# Patient Record
Sex: FEMALE | Race: OTHER | HISPANIC OR LATINO
[De-identification: names, ages, dates, MRNs, and addresses within clinical notes are randomized per-mention and may not be internally consistent; named-entity substitution may affect disease eponyms.]

---

## 2017-11-02 PROBLEM — Z00.00 ENCOUNTER FOR PREVENTIVE HEALTH EXAMINATION: Status: ACTIVE | Noted: 2017-11-02

## 2017-11-10 ENCOUNTER — ASOB RESULT (OUTPATIENT)
Age: 37
End: 2017-11-10

## 2017-11-10 ENCOUNTER — APPOINTMENT (OUTPATIENT)
Dept: MATERNAL FETAL MEDICINE | Facility: CLINIC | Age: 37
End: 2017-11-10
Payer: MEDICAID

## 2017-11-10 PROCEDURE — 99211 OFF/OP EST MAY X REQ PHY/QHP: CPT

## 2021-11-02 ENCOUNTER — APPOINTMENT (OUTPATIENT)
Dept: CT IMAGING | Facility: CLINIC | Age: 41
End: 2021-11-02

## 2022-04-11 ENCOUNTER — RESULT REVIEW (OUTPATIENT)
Age: 42
End: 2022-04-11

## 2022-04-11 ENCOUNTER — OUTPATIENT (OUTPATIENT)
Dept: OUTPATIENT SERVICES | Facility: HOSPITAL | Age: 42
LOS: 1 days | End: 2022-04-11
Payer: COMMERCIAL

## 2022-04-11 ENCOUNTER — APPOINTMENT (OUTPATIENT)
Dept: CT IMAGING | Facility: CLINIC | Age: 42
End: 2022-04-11
Payer: COMMERCIAL

## 2022-04-11 DIAGNOSIS — Z00.00 ENCOUNTER FOR GENERAL ADULT MEDICAL EXAMINATION WITHOUT ABNORMAL FINDINGS: ICD-10-CM

## 2022-04-11 PROCEDURE — 73706 CT ANGIO LWR EXTR W/O&W/DYE: CPT

## 2022-04-11 PROCEDURE — 73706 CT ANGIO LWR EXTR W/O&W/DYE: CPT | Mod: 26,50

## 2022-05-16 ENCOUNTER — APPOINTMENT (OUTPATIENT)
Dept: OTOLARYNGOLOGY | Facility: CLINIC | Age: 42
End: 2022-05-16

## 2022-06-06 ENCOUNTER — OUTPATIENT (OUTPATIENT)
Dept: OUTPATIENT SERVICES | Facility: HOSPITAL | Age: 42
LOS: 1 days | End: 2022-06-06

## 2022-06-06 ENCOUNTER — NON-APPOINTMENT (OUTPATIENT)
Age: 42
End: 2022-06-06

## 2022-06-06 VITALS
TEMPERATURE: 97 F | WEIGHT: 167.99 LBS | DIASTOLIC BLOOD PRESSURE: 64 MMHG | HEART RATE: 65 BPM | SYSTOLIC BLOOD PRESSURE: 100 MMHG | RESPIRATION RATE: 14 BRPM | OXYGEN SATURATION: 99 % | HEIGHT: 59.5 IN

## 2022-06-06 DIAGNOSIS — D16.5 BENIGN NEOPLASM OF LOWER JAW BONE: ICD-10-CM

## 2022-06-06 LAB
ANION GAP SERPL CALC-SCNC: 12 MMOL/L — SIGNIFICANT CHANGE UP (ref 7–14)
BLD GP AB SCN SERPL QL: NEGATIVE — SIGNIFICANT CHANGE UP
BUN SERPL-MCNC: 5 MG/DL — LOW (ref 7–23)
CALCIUM SERPL-MCNC: 8.9 MG/DL — SIGNIFICANT CHANGE UP (ref 8.4–10.5)
CHLORIDE SERPL-SCNC: 102 MMOL/L — SIGNIFICANT CHANGE UP (ref 98–107)
CO2 SERPL-SCNC: 25 MMOL/L — SIGNIFICANT CHANGE UP (ref 22–31)
CREAT SERPL-MCNC: 0.65 MG/DL — SIGNIFICANT CHANGE UP (ref 0.5–1.3)
EGFR: 111 ML/MIN/1.73M2 — SIGNIFICANT CHANGE UP
GLUCOSE SERPL-MCNC: 103 MG/DL — HIGH (ref 70–99)
HCG UR QL: NEGATIVE — SIGNIFICANT CHANGE UP
HCT VFR BLD CALC: 40.5 % — SIGNIFICANT CHANGE UP (ref 34.5–45)
HGB BLD-MCNC: 12.9 G/DL — SIGNIFICANT CHANGE UP (ref 11.5–15.5)
MCHC RBC-ENTMCNC: 29.9 PG — SIGNIFICANT CHANGE UP (ref 27–34)
MCHC RBC-ENTMCNC: 31.9 GM/DL — LOW (ref 32–36)
MCV RBC AUTO: 94 FL — SIGNIFICANT CHANGE UP (ref 80–100)
NRBC # BLD: 0 /100 WBCS — SIGNIFICANT CHANGE UP
NRBC # FLD: 0 K/UL — SIGNIFICANT CHANGE UP
PLATELET # BLD AUTO: 238 K/UL — SIGNIFICANT CHANGE UP (ref 150–400)
POTASSIUM SERPL-MCNC: 4.1 MMOL/L — SIGNIFICANT CHANGE UP (ref 3.5–5.3)
POTASSIUM SERPL-SCNC: 4.1 MMOL/L — SIGNIFICANT CHANGE UP (ref 3.5–5.3)
RBC # BLD: 4.31 M/UL — SIGNIFICANT CHANGE UP (ref 3.8–5.2)
RBC # FLD: 12.3 % — SIGNIFICANT CHANGE UP (ref 10.3–14.5)
RH IG SCN BLD-IMP: POSITIVE — SIGNIFICANT CHANGE UP
SODIUM SERPL-SCNC: 139 MMOL/L — SIGNIFICANT CHANGE UP (ref 135–145)
WBC # BLD: 6.91 K/UL — SIGNIFICANT CHANGE UP (ref 3.8–10.5)
WBC # FLD AUTO: 6.91 K/UL — SIGNIFICANT CHANGE UP (ref 3.8–10.5)

## 2022-06-06 NOTE — H&P PST ADULT - ENMT COMMENTS
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

## 2022-06-06 NOTE — H&P PST ADULT - HISTORY OF PRESENT ILLNESS
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

## 2022-06-06 NOTE — H&P PST ADULT - PROBLEM SELECTOR PLAN 1
Assessment and Plan: Patient scheduled for surgery on 6/21/22  Patient provided with verbal and written presurgical instructions; verbalized understanding  with teach back.    Patient provided with famotidine for GI prophylaxis; verbalized understanding.    Patient instructed to call surgeon to schedule COVID appointment     Problem: Pre-menopausal   Urine specimen cup provided

## 2022-06-06 NOTE — H&P PST ADULT - NEGATIVE ENMT SYMPTOMS
no hearing difficulty/no ear pain/no tinnitus/no vertigo/no gum bleeding/no throat pain/no dysphagia

## 2022-06-07 ENCOUNTER — NON-APPOINTMENT (OUTPATIENT)
Age: 42
End: 2022-06-07

## 2022-06-08 ENCOUNTER — NON-APPOINTMENT (OUTPATIENT)
Age: 42
End: 2022-06-08

## 2022-06-09 PROBLEM — D16.5 BENIGN NEOPLASM OF LOWER JAW BONE: Chronic | Status: ACTIVE | Noted: 2022-06-06

## 2022-06-16 ENCOUNTER — APPOINTMENT (OUTPATIENT)
Dept: OTOLARYNGOLOGY | Facility: CLINIC | Age: 42
End: 2022-06-16

## 2022-06-16 VITALS
BODY MASS INDEX: 32.39 KG/M2 | DIASTOLIC BLOOD PRESSURE: 83 MMHG | SYSTOLIC BLOOD PRESSURE: 125 MMHG | HEART RATE: 68 BPM | WEIGHT: 165 LBS | HEIGHT: 60 IN

## 2022-06-16 DIAGNOSIS — Z78.9 OTHER SPECIFIED HEALTH STATUS: ICD-10-CM

## 2022-06-16 PROCEDURE — 99024 POSTOP FOLLOW-UP VISIT: CPT

## 2022-06-20 ENCOUNTER — TRANSCRIPTION ENCOUNTER (OUTPATIENT)
Age: 42
End: 2022-06-20

## 2022-06-21 ENCOUNTER — INPATIENT (INPATIENT)
Facility: HOSPITAL | Age: 42
LOS: 6 days | Discharge: HOME | End: 2022-06-28
Attending: ORAL & MAXILLOFACIAL SURGERY | Admitting: ORAL & MAXILLOFACIAL SURGERY
Payer: SELF-PAY

## 2022-06-21 ENCOUNTER — APPOINTMENT (OUTPATIENT)
Dept: OTOLARYNGOLOGY | Facility: HOSPITAL | Age: 42
End: 2022-06-21

## 2022-06-21 ENCOUNTER — RESULT REVIEW (OUTPATIENT)
Age: 42
End: 2022-06-21

## 2022-06-21 VITALS
HEIGHT: 59.5 IN | WEIGHT: 167.99 LBS | HEART RATE: 70 BPM | OXYGEN SATURATION: 100 % | TEMPERATURE: 99 F | RESPIRATION RATE: 18 BRPM | SYSTOLIC BLOOD PRESSURE: 124 MMHG | DIASTOLIC BLOOD PRESSURE: 82 MMHG

## 2022-06-21 DIAGNOSIS — D16.5 BENIGN NEOPLASM OF LOWER JAW BONE: ICD-10-CM

## 2022-06-21 LAB
ANION GAP SERPL CALC-SCNC: 17 MMOL/L — HIGH (ref 7–14)
APTT BLD: 28.1 SEC — SIGNIFICANT CHANGE UP (ref 27–36.3)
BLOOD GAS ARTERIAL - LYTES,HGB,ICA,LACT RESULT: SIGNIFICANT CHANGE UP
BLOOD GAS ARTERIAL COMPREHENSIVE RESULT: SIGNIFICANT CHANGE UP
BUN SERPL-MCNC: 5 MG/DL — LOW (ref 7–23)
CALCIUM SERPL-MCNC: 7.9 MG/DL — LOW (ref 8.4–10.5)
CHLORIDE SERPL-SCNC: 105 MMOL/L — SIGNIFICANT CHANGE UP (ref 98–107)
CO2 SERPL-SCNC: 18 MMOL/L — LOW (ref 22–31)
CREAT SERPL-MCNC: 0.51 MG/DL — SIGNIFICANT CHANGE UP (ref 0.5–1.3)
EGFR: 118 ML/MIN/1.73M2 — SIGNIFICANT CHANGE UP
GAS PNL BLDA: SIGNIFICANT CHANGE UP
GLUCOSE BLDC GLUCOMTR-MCNC: 166 MG/DL — HIGH (ref 70–99)
GLUCOSE SERPL-MCNC: 199 MG/DL — HIGH (ref 70–99)
HCG UR QL: NEGATIVE — SIGNIFICANT CHANGE UP
HCT VFR BLD CALC: 33.3 % — LOW (ref 34.5–45)
HGB BLD-MCNC: 11.1 G/DL — LOW (ref 11.5–15.5)
INR BLD: 1.2 RATIO — HIGH (ref 0.88–1.16)
MAGNESIUM SERPL-MCNC: 1.5 MG/DL — LOW (ref 1.6–2.6)
MCHC RBC-ENTMCNC: 30 PG — SIGNIFICANT CHANGE UP (ref 27–34)
MCHC RBC-ENTMCNC: 33.3 GM/DL — SIGNIFICANT CHANGE UP (ref 32–36)
MCV RBC AUTO: 90 FL — SIGNIFICANT CHANGE UP (ref 80–100)
NRBC # BLD: 0 /100 WBCS — SIGNIFICANT CHANGE UP
NRBC # FLD: 0 K/UL — SIGNIFICANT CHANGE UP
PHOSPHATE SERPL-MCNC: 3.6 MG/DL — SIGNIFICANT CHANGE UP (ref 2.5–4.5)
PLATELET # BLD AUTO: 217 K/UL — SIGNIFICANT CHANGE UP (ref 150–400)
POTASSIUM SERPL-MCNC: 4.1 MMOL/L — SIGNIFICANT CHANGE UP (ref 3.5–5.3)
POTASSIUM SERPL-SCNC: 4.1 MMOL/L — SIGNIFICANT CHANGE UP (ref 3.5–5.3)
PROTHROM AB SERPL-ACNC: 13.9 SEC — HIGH (ref 10.5–13.4)
RBC # BLD: 3.7 M/UL — LOW (ref 3.8–5.2)
RBC # FLD: 12.5 % — SIGNIFICANT CHANGE UP (ref 10.3–14.5)
SODIUM SERPL-SCNC: 140 MMOL/L — SIGNIFICANT CHANGE UP (ref 135–145)
TSH SERPL-MCNC: 0.39 UIU/ML — SIGNIFICANT CHANGE UP (ref 0.27–4.2)
WBC # BLD: 14.85 K/UL — HIGH (ref 3.8–10.5)
WBC # FLD AUTO: 14.85 K/UL — HIGH (ref 3.8–10.5)

## 2022-06-21 PROCEDURE — 88307 TISSUE EXAM BY PATHOLOGIST: CPT | Mod: 26

## 2022-06-21 PROCEDURE — 27705 OSTEOTOMY TIBIA: CPT

## 2022-06-21 PROCEDURE — 71045 X-RAY EXAM CHEST 1 VIEW: CPT | Mod: 26

## 2022-06-21 PROCEDURE — 40844 RECONSTRUCTION OF MOUTH: CPT

## 2022-06-21 PROCEDURE — 21244 RECONSTRUCTION OF LOWER JAW: CPT

## 2022-06-21 PROCEDURE — 70360 X-RAY EXAM OF NECK: CPT | Mod: 26

## 2022-06-21 PROCEDURE — 73590 X-RAY EXAM OF LOWER LEG: CPT | Mod: 26,LT

## 2022-06-21 PROCEDURE — 20969 BONE/SKIN GRAFT MICROVASC: CPT

## 2022-06-21 DEVICE — GUIDE CUTTING RECON IPS X-LRG: Type: IMPLANTABLE DEVICE | Status: FUNCTIONAL

## 2022-06-21 DEVICE — SCREW CR DRV 2.0X11MM: Type: IMPLANTABLE DEVICE | Status: FUNCTIONAL

## 2022-06-21 DEVICE — COUPLER VESSEL ANASTOMOTIC 3MM: Type: IMPLANTABLE DEVICE | Status: FUNCTIONAL

## 2022-06-21 DEVICE — IMPLANTABLE DEVICE: Type: IMPLANTABLE DEVICE | Status: FUNCTIONAL

## 2022-06-21 DEVICE — SCREW MAXDRIVE 1 LVL 2X7MM: Type: IMPLANTABLE DEVICE | Status: FUNCTIONAL

## 2022-06-21 DEVICE — SCREW MAXDRIVE 2.0X7MM: Type: IMPLANTABLE DEVICE | Status: FUNCTIONAL

## 2022-06-21 DEVICE — SURGIFOAM PAD 8CM X 12.5CM X 2MM (100C): Type: IMPLANTABLE DEVICE | Status: FUNCTIONAL

## 2022-06-21 DEVICE — SCREW LOCKING 2X13MM: Type: IMPLANTABLE DEVICE | Status: FUNCTIONAL

## 2022-06-21 DEVICE — GUIDE RECONSTRUCT IPS CUTTING: Type: IMPLANTABLE DEVICE | Status: FUNCTIONAL

## 2022-06-21 DEVICE — SURGICEL 2 X 14": Type: IMPLANTABLE DEVICE | Status: FUNCTIONAL

## 2022-06-21 DEVICE — LIGATING CLIPS WECK HORIZON MEDIUM (BLUE) 24: Type: IMPLANTABLE DEVICE | Status: FUNCTIONAL

## 2022-06-21 DEVICE — LIGATING CLIPS WECK HORIZON SMALL-WIDE (RED) 24: Type: IMPLANTABLE DEVICE | Status: FUNCTIONAL

## 2022-06-21 DEVICE — SCREW LOKG 2X7MM: Type: IMPLANTABLE DEVICE | Status: FUNCTIONAL

## 2022-06-21 DEVICE — CARTRIDGE MICROCLIP 30: Type: IMPLANTABLE DEVICE | Status: FUNCTIONAL

## 2022-06-21 DEVICE — SCREW EMERG CROSS DRIVE TI 2X9: Type: IMPLANTABLE DEVICE | Status: FUNCTIONAL

## 2022-06-21 DEVICE — SCREW LOKG 2X9MM: Type: IMPLANTABLE DEVICE | Status: FUNCTIONAL

## 2022-06-21 DEVICE — GUIDE CUTTING RECON IPS X-SMALL: Type: IMPLANTABLE DEVICE | Status: FUNCTIONAL

## 2022-06-21 DEVICE — SCREW MAXDRIVE CMF LVL 1 2X8MM: Type: IMPLANTABLE DEVICE | Status: FUNCTIONAL

## 2022-06-21 DEVICE — SCREW LOKG 2X11MM: Type: IMPLANTABLE DEVICE | Status: FUNCTIONAL

## 2022-06-21 DEVICE — DOPPLER PROBE DISPOSABLE: Type: IMPLANTABLE DEVICE | Status: FUNCTIONAL

## 2022-06-21 RX ORDER — AMPICILLIN SODIUM AND SULBACTAM SODIUM 250; 125 MG/ML; MG/ML
1.5 INJECTION, POWDER, FOR SUSPENSION INTRAMUSCULAR; INTRAVENOUS EVERY 6 HOURS
Refills: 0 | Status: COMPLETED | OUTPATIENT
Start: 2022-06-21 | End: 2022-06-22

## 2022-06-21 RX ORDER — ONDANSETRON 8 MG/1
4 TABLET, FILM COATED ORAL EVERY 8 HOURS
Refills: 0 | Status: DISCONTINUED | OUTPATIENT
Start: 2022-06-21 | End: 2022-06-22

## 2022-06-21 RX ORDER — GLUCAGON INJECTION, SOLUTION 0.5 MG/.1ML
1 INJECTION, SOLUTION SUBCUTANEOUS ONCE
Refills: 0 | Status: DISCONTINUED | OUTPATIENT
Start: 2022-06-21 | End: 2022-06-22

## 2022-06-21 RX ORDER — PANTOPRAZOLE SODIUM 20 MG/1
40 TABLET, DELAYED RELEASE ORAL DAILY
Refills: 0 | Status: DISCONTINUED | OUTPATIENT
Start: 2022-06-21 | End: 2022-06-22

## 2022-06-21 RX ORDER — SODIUM CHLORIDE 9 MG/ML
1000 INJECTION, SOLUTION INTRAVENOUS
Refills: 0 | Status: DISCONTINUED | OUTPATIENT
Start: 2022-06-21 | End: 2022-06-22

## 2022-06-21 RX ORDER — DEXTROSE 50 % IN WATER 50 %
25 SYRINGE (ML) INTRAVENOUS ONCE
Refills: 0 | Status: DISCONTINUED | OUTPATIENT
Start: 2022-06-21 | End: 2022-06-23

## 2022-06-21 RX ORDER — DEXAMETHASONE 0.5 MG/5ML
8 ELIXIR ORAL EVERY 8 HOURS
Refills: 0 | Status: COMPLETED | OUTPATIENT
Start: 2022-06-21 | End: 2022-06-22

## 2022-06-21 RX ORDER — GABAPENTIN 400 MG/1
600 CAPSULE ORAL DAILY
Refills: 0 | Status: DISCONTINUED | OUTPATIENT
Start: 2022-06-21 | End: 2022-06-21

## 2022-06-21 RX ORDER — ACETAMINOPHEN 500 MG
975 TABLET ORAL EVERY 6 HOURS
Refills: 0 | Status: DISCONTINUED | OUTPATIENT
Start: 2022-06-21 | End: 2022-06-22

## 2022-06-21 RX ORDER — HYDROMORPHONE HYDROCHLORIDE 2 MG/ML
1 INJECTION INTRAMUSCULAR; INTRAVENOUS; SUBCUTANEOUS EVERY 4 HOURS
Refills: 0 | Status: DISCONTINUED | OUTPATIENT
Start: 2022-06-21 | End: 2022-06-21

## 2022-06-21 RX ORDER — HYDROMORPHONE HYDROCHLORIDE 2 MG/ML
0.5 INJECTION INTRAMUSCULAR; INTRAVENOUS; SUBCUTANEOUS EVERY 4 HOURS
Refills: 0 | Status: DISCONTINUED | OUTPATIENT
Start: 2022-06-21 | End: 2022-06-22

## 2022-06-21 RX ORDER — INSULIN LISPRO 100/ML
VIAL (ML) SUBCUTANEOUS EVERY 6 HOURS
Refills: 0 | Status: DISCONTINUED | OUTPATIENT
Start: 2022-06-21 | End: 2022-06-21

## 2022-06-21 RX ORDER — SENNA PLUS 8.6 MG/1
2 TABLET ORAL AT BEDTIME
Refills: 0 | Status: DISCONTINUED | OUTPATIENT
Start: 2022-06-21 | End: 2022-06-22

## 2022-06-21 RX ORDER — CHLORHEXIDINE GLUCONATE 213 G/1000ML
1 SOLUTION TOPICAL DAILY
Refills: 0 | Status: DISCONTINUED | OUTPATIENT
Start: 2022-06-21 | End: 2022-06-21

## 2022-06-21 RX ORDER — HYDROMORPHONE HYDROCHLORIDE 2 MG/ML
0.5 INJECTION INTRAMUSCULAR; INTRAVENOUS; SUBCUTANEOUS EVERY 4 HOURS
Refills: 0 | Status: DISCONTINUED | OUTPATIENT
Start: 2022-06-21 | End: 2022-06-21

## 2022-06-21 RX ORDER — MAGNESIUM SULFATE 500 MG/ML
2 VIAL (ML) INJECTION ONCE
Refills: 0 | Status: DISCONTINUED | OUTPATIENT
Start: 2022-06-21 | End: 2022-06-21

## 2022-06-21 RX ORDER — HYDROMORPHONE HYDROCHLORIDE 2 MG/ML
0.25 INJECTION INTRAMUSCULAR; INTRAVENOUS; SUBCUTANEOUS EVERY 4 HOURS
Refills: 0 | Status: DISCONTINUED | OUTPATIENT
Start: 2022-06-21 | End: 2022-06-22

## 2022-06-21 RX ORDER — CALCIUM GLUCONATE 100 MG/ML
1 VIAL (ML) INTRAVENOUS ONCE
Refills: 0 | Status: COMPLETED | OUTPATIENT
Start: 2022-06-21 | End: 2022-06-21

## 2022-06-21 RX ORDER — INSULIN LISPRO 100/ML
VIAL (ML) SUBCUTANEOUS
Refills: 0 | Status: DISCONTINUED | OUTPATIENT
Start: 2022-06-21 | End: 2022-06-22

## 2022-06-21 RX ORDER — ENOXAPARIN SODIUM 100 MG/ML
40 INJECTION SUBCUTANEOUS EVERY 24 HOURS
Refills: 0 | Status: DISCONTINUED | OUTPATIENT
Start: 2022-06-22 | End: 2022-06-28

## 2022-06-21 RX ORDER — MAGNESIUM SULFATE 500 MG/ML
2 VIAL (ML) INJECTION
Refills: 0 | Status: COMPLETED | OUTPATIENT
Start: 2022-06-21 | End: 2022-06-21

## 2022-06-21 RX ORDER — DEXTROSE 50 % IN WATER 50 %
12.5 SYRINGE (ML) INTRAVENOUS ONCE
Refills: 0 | Status: DISCONTINUED | OUTPATIENT
Start: 2022-06-21 | End: 2022-06-23

## 2022-06-21 RX ORDER — GABAPENTIN 400 MG/1
600 CAPSULE ORAL DAILY
Refills: 0 | Status: DISCONTINUED | OUTPATIENT
Start: 2022-06-21 | End: 2022-06-22

## 2022-06-21 RX ADMIN — HYDROMORPHONE HYDROCHLORIDE 1 MILLIGRAM(S): 2 INJECTION INTRAMUSCULAR; INTRAVENOUS; SUBCUTANEOUS at 20:44

## 2022-06-21 RX ADMIN — Medication 101.6 MILLIGRAM(S): at 20:40

## 2022-06-21 RX ADMIN — Medication 100 GRAM(S): at 22:41

## 2022-06-21 RX ADMIN — Medication 25 GRAM(S): at 23:23

## 2022-06-21 RX ADMIN — HYDROMORPHONE HYDROCHLORIDE 1 MILLIGRAM(S): 2 INJECTION INTRAMUSCULAR; INTRAVENOUS; SUBCUTANEOUS at 20:59

## 2022-06-21 RX ADMIN — Medication 25 GRAM(S): at 21:18

## 2022-06-21 RX ADMIN — AMPICILLIN SODIUM AND SULBACTAM SODIUM 100 GRAM(S): 250; 125 INJECTION, POWDER, FOR SUSPENSION INTRAMUSCULAR; INTRAVENOUS at 21:17

## 2022-06-21 NOTE — CONSULT NOTE ADULT - SUBJECTIVE AND OBJECTIVE BOX
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 Patient is POD#0 s/p mandibulectomy, reconstruction of the mandible with transosteal bone implant, Right neck dissection and a left lower leg free flap.       Surgery Information    Case Duration:      EBL: 300ml       IV Fluids: 3000ml LR, 500ml Albumin 5%       Blood Products:       Urine Output: 450ml   Complications:        HISTORY  42y Female  Allergies:   PAST MEDICAL & SURGICAL HISTORY:  Benign neoplasm of lower jaw bone        FAMILY HISTORY:  Family history of diabetes mellitus (DM) (Mother)        SOCIAL HISTORY:  ***    ADVANCE DIRECTIVES: Presumed Full Code  ***    REVIEW OF SYSTEMS:  ***  General: Non-Contributory  Skin/Breast: Non-Contributory  Ophthalmologic: Non-Contributory  ENMT: Non-Contributory  Respiratory and Thorax: Non-Contributory  Cardiovascular: Non-Contributory  Gastrointestinal: Non-Contributory  Genitourinary: Non-Contributory  Musculoskeletal: Non-Contributory  Neurological: Non-Contributory  Psychiatric: Non-Contributory  Hematology/Lymphatics: Non-Contributory  Endocrine: Non-Contributory  Allergic/Immunologic: Non-Contributory    HOME MEDICATIONS:  ***    CURRENT MEDICATIONS:   --------------------------------------------------------------------------------------  Neurologic Medications  acetaminophen   IVPB .. 975 milliGRAM(s) IV Intermittent every 6 hours  gabapentin Solution 600 milliGRAM(s) Oral daily  ondansetron Injectable 4 milliGRAM(s) IV Push every 8 hours PRN Nausea and/or Vomiting    Respiratory Medications    Cardiovascular Medications    Gastrointestinal Medications  dextrose 5%. 1000 milliLiter(s) IV Continuous <Continuous>  lactated ringers. 1000 milliLiter(s) IV Continuous <Continuous>  pantoprazole  Injectable 40 milliGRAM(s) IV Push daily  senna 2 Tablet(s) Oral at bedtime    Genitourinary Medications    Hematologic/Oncologic Medications    Antimicrobial/Immunologic Medications  ampicillin/sulbactam  IVPB 1.5 Gram(s) IV Intermittent every 6 hours    Endocrine/Metabolic Medications  dexAMETHasone  IVPB 8 milliGRAM(s) IV Intermittent every 8 hours  dextrose 50% Injectable 25 Gram(s) IV Push once  dextrose 50% Injectable 12.5 Gram(s) IV Push once  dextrose 50% Injectable 25 Gram(s) IV Push once  glucagon  Injectable 1 milliGRAM(s) IntraMuscular once  insulin lispro (ADMELOG) corrective regimen sliding scale   SubCutaneous every 6 hours    Topical/Other Medications    --------------------------------------------------------------------------------------    VITAL SIGNS, INS/OUTS (last 24 hours):  --------------------------------------------------------------------------------------  ICU Vital Signs Last 24 Hrs  T(C): 36.4 (21 Jun 2022 18:45), Max: 37.1 (21 Jun 2022 06:58)  T(F): 97.5 (21 Jun 2022 18:45), Max: 98.8 (21 Jun 2022 06:58)  HR: 69 (21 Jun 2022 18:45) (69 - 70)  BP: 139/88 (21 Jun 2022 18:45) (124/82 - 139/88)  BP(mean): 103 (21 Jun 2022 18:45) (103 - 103)  ABP: 135/87 (21 Jun 2022 18:45) (135/87 - 135/87)  ABP(mean): 109 (21 Jun 2022 18:45) (109 - 109)  RR: 12 (21 Jun 2022 18:45) (12 - 18)  SpO2: 100% (21 Jun 2022 18:45) (100% - 100%)        --------------------------------------------------------------------------------------    EXAM  NEUROLOGY  Exam: Normal, NAD, alert, oriented x 3, no focal deficits.     HEENT  Exam: Incision c/d/i. Steri-strips in place. Good perfusion no hematoma formation. KO tube. LEVY drain in right neck with sanguineous output.     RESPIRATORY  Exam:  Normal expansion/effort. On non-rebreather 2L satting well.     CARDIOVASCULAR  Exam:  Regular rate and rhythm.      GI/NUTRITION  Exam: Abdomen soft, Non-tender, Non-distended. KO tube.  Current Diet:  NPO     VASCULAR  Exam: Extremities warm, pink, well-perfused.     MUSCULOSKELETAL  Exam: All extremities moving spontaneously without limitations.  LEVY drain in right leg sanguineous output .       METABOLIC/FLUIDS/ELECTROLYTES  dextrose 5%. 1000 milliLiter(s) IV Continuous <Continuous>  lactated ringers. 1000 milliLiter(s) IV Continuous <Continuous>      HEMATOLOGIC  [x] DVT Prophylaxis:   Transfusions:	[] PRBC	[] Platelets		[] FFP	[] Cryoprecipitate    INFECTIOUS DISEASE  Antimicrobials/Immunologic Medications:  ampicillin/sulbactam  IVPB 1.5 Gram(s) IV Intermittent every 6 hours    Day #  	of    ***    Tubes/Lines/Drains  ***  [x] Peripheral IV  [] Central Venous Line     	[] R	[] L	[] IJ	[] Fem	[] SC	Date Placed:   [] Arterial Line		[x] R	[] L	[] Fem	[x] Rad	[] Ax	Date Placed:   [] PICC:         	[] Midline		[] Mediport  [x] Urinary Catheter		Date Placed:     LABS  --------------------------------------------------------------------------------------  CBC (06-21 @ 18:45)                              11.1<L>                         14.85<H>  )----------------(  217        --    % Neutrophils, --    % Lymphocytes, ANC: --                                  33.3<L>    BMP (06-21 @ 18:45)             140     |  105     |  5<L>  		Ca++ --      Ca 7.9<L>             ---------------------------------( 199<H>		Mg --                 4.1     |  18<L>   |  0.51  			Ph --          Coags (06-21 @ 18:45)  aPTT 28.1 / INR 1.20<H> / PT 13.9<H>      ABG (06-21 @ 18:45)     7.28<L> / 43<H> / 149<H> / 20<L> / -6.3<L> / 99.3<H>%     Lactate:    ABG (06-21 @ 14:23)     7.35 / 39<H> / 219<H> / 22 / -3.8<L> / 100.0<H>%     Lactate:        --------------------------------------------------------------------------------------    OTHER LABS    IMAGING RESULTS  Echo:   CT:   Xray;     ASSESSMENT:  42 year old [place]val[place] speaking female 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 Patient is POD#0 s/p mandibulectomy, reconstruction of the mandible with transosteal bone implant, Right neck dissection and a left lower leg free flap.       PLAN:    Neurologic:   -q1h flap checks  -Tylenol standing  -gabapentin 600mg daily per protocol     Respiratory:   -patient on non-rebreather @2L   -keep O2 sat >92%  -IS    Cardiovascular:   -no active issues  -keep MAP goal >65%    Gastrointestinal/Nutrition:  -diet: NPO   -Protonix  -senna and Miralax      Renal/Genitourinary:   -no active issues  -LR @ 100ml/hour   -graham  -monitor I&Os  -replete electrolytes as necessary    Hematologic:   -SCDs  -restarting chemical dvt px in the AM    Infectious Disease:   -Unasyn for 24 hours per protocol    Tubes/Lines/Drains:   -2 PIVS  -Right radial A line     Endocrine:   -no active issues     Disposition: SICU    --------------------------------------------------------------------------------------    Critical Care Diagnoses:   SICU Consultation Note  =====================================================  HPI:  42 year old [place]yan speaking female 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 Patient is POD#0 s/p mandibulectomy, reconstruction of the mandible with transosteal bone implant, Right neck dissection and a left lower leg free flap.       Surgery Information    Case Duration:      EBL: 300ml       IV Fluids: 3000ml LR, 500ml Albumin 5%       Blood Products:       Urine Output: 450ml   Complications:        HISTORY  42y Female  Allergies:   PAST MEDICAL & SURGICAL HISTORY:  Benign neoplasm of lower jaw bone        FAMILY HISTORY:  Family history of diabetes mellitus (DM) (Mother)        SOCIAL HISTORY:  ***    ADVANCE DIRECTIVES: Presumed Full Code  ***    REVIEW OF SYSTEMS:  ***  General: Non-Contributory  Skin/Breast: Non-Contributory  Ophthalmologic: Non-Contributory  ENMT: Non-Contributory  Respiratory and Thorax: Non-Contributory  Cardiovascular: Non-Contributory  Gastrointestinal: Non-Contributory  Genitourinary: Non-Contributory  Musculoskeletal: Non-Contributory  Neurological: Non-Contributory  Psychiatric: Non-Contributory  Hematology/Lymphatics: Non-Contributory  Endocrine: Non-Contributory  Allergic/Immunologic: Non-Contributory    HOME MEDICATIONS:  ***    CURRENT MEDICATIONS:   --------------------------------------------------------------------------------------  Neurologic Medications  acetaminophen   IVPB .. 975 milliGRAM(s) IV Intermittent every 6 hours  gabapentin Solution 600 milliGRAM(s) Oral daily  ondansetron Injectable 4 milliGRAM(s) IV Push every 8 hours PRN Nausea and/or Vomiting    Respiratory Medications    Cardiovascular Medications    Gastrointestinal Medications  dextrose 5%. 1000 milliLiter(s) IV Continuous <Continuous>  lactated ringers. 1000 milliLiter(s) IV Continuous <Continuous>  pantoprazole  Injectable 40 milliGRAM(s) IV Push daily  senna 2 Tablet(s) Oral at bedtime    Genitourinary Medications    Hematologic/Oncologic Medications    Antimicrobial/Immunologic Medications  ampicillin/sulbactam  IVPB 1.5 Gram(s) IV Intermittent every 6 hours    Endocrine/Metabolic Medications  dexAMETHasone  IVPB 8 milliGRAM(s) IV Intermittent every 8 hours  dextrose 50% Injectable 25 Gram(s) IV Push once  dextrose 50% Injectable 12.5 Gram(s) IV Push once  dextrose 50% Injectable 25 Gram(s) IV Push once  glucagon  Injectable 1 milliGRAM(s) IntraMuscular once  insulin lispro (ADMELOG) corrective regimen sliding scale   SubCutaneous every 6 hours    Topical/Other Medications    --------------------------------------------------------------------------------------    VITAL SIGNS, INS/OUTS (last 24 hours):  --------------------------------------------------------------------------------------  ICU Vital Signs Last 24 Hrs  T(C): 36.4 (21 Jun 2022 18:45), Max: 37.1 (21 Jun 2022 06:58)  T(F): 97.5 (21 Jun 2022 18:45), Max: 98.8 (21 Jun 2022 06:58)  HR: 69 (21 Jun 2022 18:45) (69 - 70)  BP: 139/88 (21 Jun 2022 18:45) (124/82 - 139/88)  BP(mean): 103 (21 Jun 2022 18:45) (103 - 103)  ABP: 135/87 (21 Jun 2022 18:45) (135/87 - 135/87)  ABP(mean): 109 (21 Jun 2022 18:45) (109 - 109)  RR: 12 (21 Jun 2022 18:45) (12 - 18)  SpO2: 100% (21 Jun 2022 18:45) (100% - 100%)        --------------------------------------------------------------------------------------    EXAM  NEUROLOGY  Exam: Normal, NAD, alert, oriented x 3, no focal deficits.     HEENT  Exam: Incision c/d/i. Steri-strips in place. Good perfusion no hematoma formation. KO tube. LEVY drain in right neck with sanguineous output.     RESPIRATORY  Exam:  Normal expansion/effort. On non-rebreather 2L satting well.     CARDIOVASCULAR  Exam:  Regular rate and rhythm.      GI/NUTRITION  Exam: Abdomen soft, Non-tender, Non-distended. KO tube.  Current Diet:  NPO     VASCULAR  Exam: Extremities warm, pink, well-perfused.     MUSCULOSKELETAL  Exam: All extremities moving spontaneously without limitations.  LEVY drain in right leg sanguineous output .       METABOLIC/FLUIDS/ELECTROLYTES  dextrose 5%. 1000 milliLiter(s) IV Continuous <Continuous>  lactated ringers. 1000 milliLiter(s) IV Continuous <Continuous>      HEMATOLOGIC  [x] DVT Prophylaxis:   Transfusions:	[] PRBC	[] Platelets		[] FFP	[] Cryoprecipitate    INFECTIOUS DISEASE  Antimicrobials/Immunologic Medications:  ampicillin/sulbactam  IVPB 1.5 Gram(s) IV Intermittent every 6 hours    Day #  	of    ***    Tubes/Lines/Drains  ***  [x] Peripheral IV  [] Central Venous Line     	[] R	[] L	[] IJ	[] Fem	[] SC	Date Placed:   [] Arterial Line		[x] R	[] L	[] Fem	[x] Rad	[] Ax	Date Placed:   [] PICC:         	[] Midline		[] Mediport  [x] Urinary Catheter		Date Placed:     LABS  --------------------------------------------------------------------------------------  CBC (06-21 @ 18:45)                              11.1<L>                         14.85<H>  )----------------(  217        --    % Neutrophils, --    % Lymphocytes, ANC: --                                  33.3<L>    BMP (06-21 @ 18:45)             140     |  105     |  5<L>  		Ca++ --      Ca 7.9<L>             ---------------------------------( 199<H>		Mg --                 4.1     |  18<L>   |  0.51  			Ph --          Coags (06-21 @ 18:45)  aPTT 28.1 / INR 1.20<H> / PT 13.9<H>      ABG (06-21 @ 18:45)     7.28<L> / 43<H> / 149<H> / 20<L> / -6.3<L> / 99.3<H>%     Lactate:    ABG (06-21 @ 14:23)     7.35 / 39<H> / 219<H> / 22 / -3.8<L> / 100.0<H>%     Lactate:        --------------------------------------------------------------------------------------    OTHER LABS    IMAGING RESULTS  Echo:   CT:   Xray;     ASSESSMENT:  42 year old [place] speaking female 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 Patient is POD#0 s/p mandibulectomy, reconstruction of the mandible with transosteal bone implant, Right neck dissection and a left lower leg free flap.       PLAN:    Neurologic:   -q1h flap checks  -Tylenol standing  -Dilaudid PRN   -gabapentin 600mg daily per protocol     Respiratory:   -patient on non-rebreather @2L   -keep O2 sat >92%  -IS    Cardiovascular:   -no active issues  -keep MAP goal >65%    Gastrointestinal/Nutrition:  -diet: NPO   -Protonix  -senna and Miralax      Renal/Genitourinary:   -no active issues  -LR @ 100ml/hour   -graham  -monitor I&Os  -replete electrolytes as necessary    Hematologic:   -SCDs  -restarting chemical dvt px in the AM    Infectious Disease:   -Unasyn for 24 hours per protocol    Tubes/Lines/Drains:   -2 PIVS  -Right radial A line   -2 LEVY drains    Endocrine:   -no active issues   -ISS    Disposition: SICU    --------------------------------------------------------------------------------------    Critical Care Diagnoses:

## 2022-06-21 NOTE — H&P ADULT - NSHPREVIEWOFSYSTEMS_GEN_ALL_CORE
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

## 2022-06-21 NOTE — H&P ADULT - NSHPPHYSICALEXAM_GEN_ALL_CORE
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- right mandible metal implant in place, left cheek swelling  · Neck	detailed exam  · Neck Details	no JVD  · Breasts	not examined  · Respiratory	detailed exam  · Respiratory Details	breath sounds equal; good air movement; respirations non-labored; clear to auscultation bilaterally  · Cardiovascular	detailed exam  · Cardiovascular Details	regular rate and rhythm  no murmur  · Cardiovascular Details	positive S1; positive S2  · Gastrointestinal	Soft, non-tender, no hepatosplenomegaly, normal bowel sounds  · Genitourinary	not examined  · Rectal	not examined  · Extremities	No cyanosis, clubbing or edema  · Vascular	Equal and normal pulses (carotid, femoral, dorsalis pedis)  · Neurological	Alert & oriented; no sensory, motor or coordination deficits, normal reflexes  · Skin	No lesions; no rash  · Lymph Nodes	detailed exam  · Lymphatics Comments	No supraclavicular or clavicular adenopathy noted on assessment  · Musculoskeletal	No joint pain, swelling or deformity; no limitation of movement  · Psychiatric	Affect and characteristics of appearance, verbalizations, behaviors are appropriate

## 2022-06-21 NOTE — H&P ADULT - ASSESSMENT
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

## 2022-06-21 NOTE — H&P ADULT - HISTORY OF PRESENT ILLNESS
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

## 2022-06-21 NOTE — PROGRESS NOTE ADULT - SUBJECTIVE AND OBJECTIVE BOX
Notified by SICU that feeding tube is in lung. Confirmed by radiology on XR. May remove feeding tube and leave out. Will attempt replacement tomorrow in AM vs IR consult for placement.

## 2022-06-21 NOTE — H&P ADULT - NSHPADDITIONALINFOADULT_GEN_ALL_CORE
I have reviewed the history and physical, there are no changes to the medical history  I have reviewed the surgical treatment plan and discussed the risks of surgery with an   The patient signed the surgical consent after she was given the opportunity to ask questions

## 2022-06-21 NOTE — BRIEF OPERATIVE NOTE - NSICDXBRIEFPROCEDURE_GEN_ALL_CORE_FT
PROCEDURES:  Free flap, fibula 21-Jun-2022 18:45:58  Clyde Pearson  Complex oral vestibuloplasty 21-Jun-2022 18:46:10  Clyde Pearson

## 2022-06-22 LAB
A1C WITH ESTIMATED AVERAGE GLUCOSE RESULT: 5.2 % — SIGNIFICANT CHANGE UP (ref 4–5.6)
ANION GAP SERPL CALC-SCNC: 13 MMOL/L — SIGNIFICANT CHANGE UP (ref 7–14)
BLOOD GAS ARTERIAL COMPREHENSIVE RESULT: SIGNIFICANT CHANGE UP
BUN SERPL-MCNC: 5 MG/DL — LOW (ref 7–23)
CALCIUM SERPL-MCNC: 8 MG/DL — LOW (ref 8.4–10.5)
CHLORIDE SERPL-SCNC: 100 MMOL/L — SIGNIFICANT CHANGE UP (ref 98–107)
CO2 SERPL-SCNC: 21 MMOL/L — LOW (ref 22–31)
CREAT SERPL-MCNC: 0.53 MG/DL — SIGNIFICANT CHANGE UP (ref 0.5–1.3)
EGFR: 117 ML/MIN/1.73M2 — SIGNIFICANT CHANGE UP
ESTIMATED AVERAGE GLUCOSE: 103 — SIGNIFICANT CHANGE UP
GLUCOSE BLDC GLUCOMTR-MCNC: 142 MG/DL — HIGH (ref 70–99)
GLUCOSE BLDC GLUCOMTR-MCNC: 166 MG/DL — HIGH (ref 70–99)
GLUCOSE BLDC GLUCOMTR-MCNC: 180 MG/DL — HIGH (ref 70–99)
GLUCOSE SERPL-MCNC: 194 MG/DL — HIGH (ref 70–99)
HCT VFR BLD CALC: 33.3 % — LOW (ref 34.5–45)
HGB BLD-MCNC: 11.1 G/DL — LOW (ref 11.5–15.5)
MAGNESIUM SERPL-MCNC: 2.5 MG/DL — SIGNIFICANT CHANGE UP (ref 1.6–2.6)
MCHC RBC-ENTMCNC: 30 PG — SIGNIFICANT CHANGE UP (ref 27–34)
MCHC RBC-ENTMCNC: 33.3 GM/DL — SIGNIFICANT CHANGE UP (ref 32–36)
MCV RBC AUTO: 90 FL — SIGNIFICANT CHANGE UP (ref 80–100)
NRBC # BLD: 0 /100 WBCS — SIGNIFICANT CHANGE UP
NRBC # FLD: 0 K/UL — SIGNIFICANT CHANGE UP
PHOSPHATE SERPL-MCNC: 2.3 MG/DL — LOW (ref 2.5–4.5)
PLATELET # BLD AUTO: 203 K/UL — SIGNIFICANT CHANGE UP (ref 150–400)
POTASSIUM SERPL-MCNC: 4.2 MMOL/L — SIGNIFICANT CHANGE UP (ref 3.5–5.3)
POTASSIUM SERPL-SCNC: 4.2 MMOL/L — SIGNIFICANT CHANGE UP (ref 3.5–5.3)
RBC # BLD: 3.7 M/UL — LOW (ref 3.8–5.2)
RBC # FLD: 12.4 % — SIGNIFICANT CHANGE UP (ref 10.3–14.5)
SODIUM SERPL-SCNC: 134 MMOL/L — LOW (ref 135–145)
WBC # BLD: 14.7 K/UL — HIGH (ref 3.8–10.5)
WBC # FLD AUTO: 14.7 K/UL — HIGH (ref 3.8–10.5)

## 2022-06-22 PROCEDURE — 99231 SBSQ HOSP IP/OBS SF/LOW 25: CPT | Mod: 24,GC

## 2022-06-22 PROCEDURE — 71045 X-RAY EXAM CHEST 1 VIEW: CPT | Mod: 26

## 2022-06-22 RX ORDER — HYDROMORPHONE HYDROCHLORIDE 2 MG/ML
0.25 INJECTION INTRAMUSCULAR; INTRAVENOUS; SUBCUTANEOUS EVERY 4 HOURS
Refills: 0 | Status: DISCONTINUED | OUTPATIENT
Start: 2022-06-22 | End: 2022-06-23

## 2022-06-22 RX ORDER — HYDROMORPHONE HYDROCHLORIDE 2 MG/ML
0.5 INJECTION INTRAMUSCULAR; INTRAVENOUS; SUBCUTANEOUS EVERY 4 HOURS
Refills: 0 | Status: DISCONTINUED | OUTPATIENT
Start: 2022-06-22 | End: 2022-06-23

## 2022-06-22 RX ORDER — POTASSIUM PHOSPHATE, MONOBASIC POTASSIUM PHOSPHATE, DIBASIC 236; 224 MG/ML; MG/ML
30 INJECTION, SOLUTION INTRAVENOUS ONCE
Refills: 0 | Status: COMPLETED | OUTPATIENT
Start: 2022-06-22 | End: 2022-06-22

## 2022-06-22 RX ORDER — ACETAMINOPHEN 500 MG
975 TABLET ORAL EVERY 6 HOURS
Refills: 0 | Status: DISCONTINUED | OUTPATIENT
Start: 2022-06-22 | End: 2022-06-22

## 2022-06-22 RX ORDER — CHLORHEXIDINE GLUCONATE 213 G/1000ML
15 SOLUTION TOPICAL
Refills: 0 | Status: DISCONTINUED | OUTPATIENT
Start: 2022-06-22 | End: 2022-06-28

## 2022-06-22 RX ORDER — HYDROMORPHONE HYDROCHLORIDE 2 MG/ML
0.5 INJECTION INTRAMUSCULAR; INTRAVENOUS; SUBCUTANEOUS EVERY 4 HOURS
Refills: 0 | Status: DISCONTINUED | OUTPATIENT
Start: 2022-06-22 | End: 2022-06-22

## 2022-06-22 RX ORDER — SODIUM CHLORIDE 9 MG/ML
1000 INJECTION, SOLUTION INTRAVENOUS
Refills: 0 | Status: DISCONTINUED | OUTPATIENT
Start: 2022-06-22 | End: 2022-06-23

## 2022-06-22 RX ORDER — HYDROMORPHONE HYDROCHLORIDE 2 MG/ML
0.25 INJECTION INTRAMUSCULAR; INTRAVENOUS; SUBCUTANEOUS EVERY 4 HOURS
Refills: 0 | Status: DISCONTINUED | OUTPATIENT
Start: 2022-06-22 | End: 2022-06-22

## 2022-06-22 RX ORDER — SODIUM CHLORIDE 9 MG/ML
1000 INJECTION, SOLUTION INTRAVENOUS
Refills: 0 | Status: DISCONTINUED | OUTPATIENT
Start: 2022-06-22 | End: 2022-06-22

## 2022-06-22 RX ORDER — OXYCODONE HYDROCHLORIDE 5 MG/1
5 TABLET ORAL EVERY 6 HOURS
Refills: 0 | Status: DISCONTINUED | OUTPATIENT
Start: 2022-06-22 | End: 2022-06-22

## 2022-06-22 RX ORDER — INSULIN LISPRO 100/ML
VIAL (ML) SUBCUTANEOUS EVERY 6 HOURS
Refills: 0 | Status: DISCONTINUED | OUTPATIENT
Start: 2022-06-22 | End: 2022-06-27

## 2022-06-22 RX ORDER — ACETAMINOPHEN 500 MG
1000 TABLET ORAL EVERY 6 HOURS
Refills: 0 | Status: DISCONTINUED | OUTPATIENT
Start: 2022-06-22 | End: 2022-06-23

## 2022-06-22 RX ORDER — INSULIN LISPRO 100/ML
VIAL (ML) SUBCUTANEOUS EVERY 6 HOURS
Refills: 0 | Status: DISCONTINUED | OUTPATIENT
Start: 2022-06-22 | End: 2022-06-22

## 2022-06-22 RX ADMIN — SODIUM CHLORIDE 100 MILLILITER(S): 9 INJECTION, SOLUTION INTRAVENOUS at 07:11

## 2022-06-22 RX ADMIN — AMPICILLIN SODIUM AND SULBACTAM SODIUM 100 GRAM(S): 250; 125 INJECTION, POWDER, FOR SUSPENSION INTRAMUSCULAR; INTRAVENOUS at 02:58

## 2022-06-22 RX ADMIN — Medication 390 MILLIGRAM(S): at 01:00

## 2022-06-22 RX ADMIN — HYDROMORPHONE HYDROCHLORIDE 0.25 MILLIGRAM(S): 2 INJECTION INTRAMUSCULAR; INTRAVENOUS; SUBCUTANEOUS at 20:40

## 2022-06-22 RX ADMIN — SODIUM CHLORIDE 100 MILLILITER(S): 9 INJECTION, SOLUTION INTRAVENOUS at 08:05

## 2022-06-22 RX ADMIN — Medication 101.6 MILLIGRAM(S): at 11:15

## 2022-06-22 RX ADMIN — Medication 975 MILLIGRAM(S): at 11:15

## 2022-06-22 RX ADMIN — Medication 2: at 06:42

## 2022-06-22 RX ADMIN — Medication 390 MILLIGRAM(S): at 05:48

## 2022-06-22 RX ADMIN — HYDROMORPHONE HYDROCHLORIDE 0.25 MILLIGRAM(S): 2 INJECTION INTRAMUSCULAR; INTRAVENOUS; SUBCUTANEOUS at 20:25

## 2022-06-22 RX ADMIN — Medication 390 MILLIGRAM(S): at 11:15

## 2022-06-22 RX ADMIN — Medication: at 00:10

## 2022-06-22 RX ADMIN — AMPICILLIN SODIUM AND SULBACTAM SODIUM 100 GRAM(S): 250; 125 INJECTION, POWDER, FOR SUSPENSION INTRAMUSCULAR; INTRAVENOUS at 11:15

## 2022-06-22 RX ADMIN — AMPICILLIN SODIUM AND SULBACTAM SODIUM 100 GRAM(S): 250; 125 INJECTION, POWDER, FOR SUSPENSION INTRAMUSCULAR; INTRAVENOUS at 07:11

## 2022-06-22 RX ADMIN — Medication 101.6 MILLIGRAM(S): at 04:02

## 2022-06-22 RX ADMIN — AMPICILLIN SODIUM AND SULBACTAM SODIUM 100 GRAM(S): 250; 125 INJECTION, POWDER, FOR SUSPENSION INTRAMUSCULAR; INTRAVENOUS at 18:34

## 2022-06-22 RX ADMIN — CHLORHEXIDINE GLUCONATE 15 MILLILITER(S): 213 SOLUTION TOPICAL at 18:38

## 2022-06-22 RX ADMIN — Medication 400 MILLIGRAM(S): at 23:58

## 2022-06-22 RX ADMIN — Medication 2: at 11:15

## 2022-06-22 RX ADMIN — ENOXAPARIN SODIUM 40 MILLIGRAM(S): 100 INJECTION SUBCUTANEOUS at 11:15

## 2022-06-22 RX ADMIN — POTASSIUM PHOSPHATE, MONOBASIC POTASSIUM PHOSPHATE, DIBASIC 83.33 MILLIMOLE(S): 236; 224 INJECTION, SOLUTION INTRAVENOUS at 05:48

## 2022-06-22 NOTE — PROGRESS NOTE ADULT - SUBJECTIVE AND OBJECTIVE BOX
ANESTHESIA POSTOP CHECK    42y Female POSTOP DAY 1     No COMPLAINTS    NO APPARENT ANESTHESIA COMPLICATIONS

## 2022-06-22 NOTE — PHYSICAL THERAPY INITIAL EVALUATION ADULT - NS ASR RISK AREAS PT EVAL
CIRCUMCISION PHYSICIAN PROCEDURE NOTE    Pre-op Diagnosis: Elective Circumcision    Procedure: Elective Circumcision  Risks and benefits of procedure discussed with parent(s) prior to procedure per physician  Signed, informed consent for circumcision on chart  Identification bands checked    Positioning of Baby: On back - legs immobilized    Site prepared with: Betadine    \"Time Out\" completed and agreed upon by all team members present for correct patient identification, circumcision procedure (removal of penile foreskin), signed informed consent and provider performing procedure: Done     Anesthetic agent used: Ring block with 1% Lidocaine    Equipment for circumcision procedure: Gomco 1.1 cm    Specimens: Not applicable    Estimated blood loss: < 1 ml    Hemostasis: adequate    Complications: None     Dressing: Petroleum gauze    Procedure findings: Normal Genitalia    Post-op diagnosis: Elective Circumcision      
fall

## 2022-06-22 NOTE — PHYSICAL THERAPY INITIAL EVALUATION ADULT - ADDITIONAL COMMENTS
Patient lives with her family in private home, 3 steps to enter and none to bedroom/bathroom. Patient was independent in all ADL prior to admission. Patient was not using assistive device prior to admission.

## 2022-06-22 NOTE — PROGRESS NOTE ADULT - ASSESSMENT
Pt is a 42F s/p mandibulectomy with OMFS and reconstruction with free fibula flap on 6/21, since doing well    - flap checks for exam and Cook signal q2 starting this evening  - Peridex for oral hygiene  - continue drains, monitor output  - remainder care per SICU/primary    #[id number]

## 2022-06-22 NOTE — PROGRESS NOTE ADULT - SUBJECTIVE AND OBJECTIVE BOX
Plastic Surgery Progress Note    Subjective: seen on rounds, no issues    Objective:  Exam:   General: NAD  Neuro: Alert  Pulm: comfortable  HEENT: flap with good signal and color, incisions intact, neck soft, drain with s/s output  Lower leg: dressing in place c/d/i, drain with s/s output, soft    Vital Signs Last 24 Hrs  T(C): 37.6 (22 Jun 2022 12:00), Max: 37.6 (22 Jun 2022 12:00)  T(F): 99.6 (22 Jun 2022 12:00), Max: 99.6 (22 Jun 2022 12:00)  HR: 82 (22 Jun 2022 14:00) (65 - 110)  BP: 110/76 (22 Jun 2022 14:00) (104/70 - 141/77)  BP(mean): 88 (22 Jun 2022 14:00) (79 - 110)  RR: 16 (22 Jun 2022 14:00) (10 - 23)  SpO2: 98% (22 Jun 2022 14:00) (96% - 100%)    I&O's Detail    21 Jun 2022 07:01  -  22 Jun 2022 07:00  --------------------------------------------------------  IN:    IV PiggyBack: 200 mL    Lactated Ringers: 1200 mL  Total IN: 1400 mL    OUT:    Bulb (mL): 10 mL    Bulb (mL): 120 mL    Indwelling Catheter - Urethral (mL): 1925 mL  Total OUT: 2055 mL    Total NET: -655 mL      22 Jun 2022 07:01  -  22 Jun 2022 14:45  --------------------------------------------------------  IN:    dextrose 5% + lactated ringers: 400 mL  Total IN: 400 mL    OUT:    Indwelling Catheter - Urethral (mL): 200 mL  Total OUT: 200 mL    Total NET: 200 mL      MEDICATIONS  (STANDING):  acetaminophen    Suspension .. 975 milliGRAM(s) Enteral Tube every 6 hours  ampicillin/sulbactam  IVPB 1.5 Gram(s) IV Intermittent every 6 hours  dexAMETHasone  IVPB 8 milliGRAM(s) IV Intermittent every 8 hours  dextrose 50% Injectable 25 Gram(s) IV Push once  dextrose 50% Injectable 12.5 Gram(s) IV Push once  dextrose 50% Injectable 25 Gram(s) IV Push once  enoxaparin Injectable 40 milliGRAM(s) SubCutaneous every 24 hours  gabapentin Solution 600 milliGRAM(s) Oral daily  insulin lispro (ADMELOG) corrective regimen sliding scale   SubCutaneous every 6 hours  senna 2 Tablet(s) Oral at bedtime    MEDICATIONS  (PRN):  ondansetron Injectable 4 milliGRAM(s) IV Push every 8 hours PRN Nausea and/or Vomiting  oxyCODONE    Solution 5 milliGRAM(s) Oral every 6 hours PRN Moderate Pain (4 - 6)      LABS:                        11.1   14.70 )-----------( 203      ( 22 Jun 2022 01:16 )             33.3     06-22    134<L>  |  100  |  5<L>  ----------------------------<  194<H>  4.2   |  21<L>  |  0.53    Ca    8.0<L>      22 Jun 2022 01:16  Phos  2.3     06-22  Mg     2.50     06-22      PT/INR - ( 21 Jun 2022 18:45 )   PT: 13.9 sec;   INR: 1.20 ratio         PTT - ( 21 Jun 2022 18:45 )  PTT:28.1 sec

## 2022-06-22 NOTE — PATIENT PROFILE ADULT - SURGICAL SITE DESCRIPTION
After Visit Summary   11/21/2017    Farida Baldwin    MRN: [id number]           Patient Information     Date Of Birth          1951        Visit Information        Provider Department      11/21/2017 11:15 AM Arthur Alaniz, DO [facility] [place]        Today's Diagnoses     Special screening for malignant neoplasms, colon    -  1    History of adenomatous polyp of colon          Care Instructions          Thank you for allowing Dr. Alaniz and our surgical team to participate in your care.  If you have a scheduling or an appointment question please contact [facility] Unit Coordinator at her direct line [phone].   ALL nursing questions or concerns can be directed to Stephanie at: [phone]     You are scheduled for a: colonoscopy  Your procedure date is: 12/13/17    You need a friend or family member available to drive you home AND stay with you for 24 hours after you leave the hospital. You will not be allowed to drive yourself. IF you need to take a taxi or the bus you MUST have a responsible person to ride with you. YOUR PROCEDURE WILL BE CANCELLED IF YOU DO NOT HAVE A RESPONSIBLE ADULT TO DRIVE YOU HOME.       You CANNOT have anything to eat or drink after midnight the night before your surgery, ncluding water and coffee. Your stomach needs to be completely empty. Do NOT chew gum, suck on hard candy, or smoke. You can brush your teeth the morning of surgery.       You need to call our Surgery Education Nurses 1-2 weeks prior to your surgery date at  [phone] or toll free [phone]. Please have you medication and allergy lists ready.      Stop your aspirin or other NSAIDs(Ibuprofen, Motrin, Aleve, Celebrex, Naproxen, etc...) 7 days before your surgery.      Hospital admitting will call you the day before your surgery with your arrival time. If you are scheduled on a Monday admitting will call you the Friday before.      Please call your primary care physician if  "you should become ill within 24 hours of scheduled surgery. (ex.vomiting, diarrhea, fever)  Surgery Education will contact you the day before your procedure between the hours of noon and 5 pm with the time you need to register in admitting at the hospital. Call Stephanie with any questions [phone]  You will need to stop your multivitamin for the five days you are on the low fiber diet.   Day of surgery hold all medications until you arrive home from your procedure at which time you may resume all of them like normal.            Follow-ups after your visit        Who to contact     If you have questions or need follow up information about today's clinic visit or your schedule please contact [facility] directly at [phone].  Normal or non-critical lab and imaging results will be communicated to you by DigitalMRhart, letter or phone within 4 business days after the clinic has received the results. If you do not hear from us within 7 days, please contact the clinic through [email or web address] or phone. If you have a critical or abnormal lab result, we will notify you by phone as soon as possible.  Submit refill requests through Gradible (formerly gradsavers) or call your pharmacy and they will forward the refill request to us. Please allow 3 business days for your refill to be completed.          Additional Information About Your Visit        Grad[employer] (formerly [employer]sa[employer]) Information     [employer] (formerly gradsavers) lets you send messages to your doctor, view your test results, renew your prescriptions, schedule appointments and more. To sign up, go to [email or web address] (formerly [employer]savers) . Click on \"Log in\" on the left side of the screen, which will take you to the Welcome page. Then click on \"Sign up Now\" on the right side of the page.     You will be asked to enter the access code listed below, as well as some personal information. Please follow the directions to create your username and password.     Your access code is: [email or web address]  Expires: 2/19/2018 11:27 AM     Your access code will " " in 90 days. If you need help or a new code, please call your [place] clinic or [phone].        Care EveryWhere ID     This is your Care EveryWhere ID. This could be used by other organizations to access your [place] [place] medical records  [id number]        Your Vitals Were     Pulse Temperature Respirations Height Pulse Oximetry BMI (Body Mass Index)    73 98.2  F (36.8  C) 18 5' 6\" (1.676 m) 95% 23.4 kg/m2       Blood Pressure from Last 3 Encounters:   17 115/75   05/10/17 110/70   16 116/70    Weight from Last 3 Encounters:   17 145 lb (65.8 kg)   05/10/17 146 lb (66.2 kg)   16 154 lb (69.9 kg)              Today, you had the following     No orders found for display         Today's Medication Changes          These changes are accurate as of: 17 11:27 AM.  If you have any questions, ask your nurse or doctor.               Start taking these medicines.        Dose/Directions    Na Sulfate-K Sulfate-Mg Sulf solution   Commonly known as:  SUPREP BOWEL PREP   Used for:  Special screening for malignant neoplasms, colon   Started by:  Arthur Alaniz, DO        Dose:  2 Bottle   Take 354 mLs (2 Bottles) by mouth See Admin Instructions   Quantity:  2 Bottle   Refills:  0         These medicines have changed or have updated prescriptions.        Dose/Directions    aspirin 81 MG EC tablet   This may have changed:  when to take this   Used for:  Hyperlipidemia LDL goal < 100        Dose:  81 mg   Take 1 tablet (81 mg) by mouth daily   Quantity:  90 tablet   Refills:  3            Where to get your medicines      These medications were sent to [facility] [address] [place] [address] - [address] AT [facility] [address] [facility] [address]     Phone:  [phone]     Na Sulfate-K Sulfate-Mg Sulf solution                Primary Care Provider Office Phone # Fax #    Irma HutchinsonJAMES duran [phone]       [facility] [address]  [facility] " [id number]        Equal Access to Services     [facility]: Hadii aad ku hadaliciadusty Lynnetteali, waklausda luneemacharlesha, qaybta catherinelatonyacarolynn campoverde. So [facility] [phone].    ATENCIÓN: Si habla español, tiene a valle disposición servicios gratuitos de asistencia lingüística. Llame al [phone].    We comply with applicable federal civil rights laws and [place] laws. We do not discriminate on the basis of race, color, national origin, age, disability, sex, sexual orientation, or gender identity.            Thank you!     Thank you for choosing [facility]  for your care. Our goal is always to provide you with excellent care. Hearing back from our patients is one way we can continue to improve our services. Please take a few minutes to complete the written survey that you may receive in the mail after your visit with us. Thank you!             Your Updated Medication List - Protect others around you: Learn how to safely use, store and throw away your medicines at www.disposemymeds.org.          This list is accurate as of: 11/21/17 11:27 AM.  Always use your most recent med list.                   Brand Name Dispense Instructions for use Diagnosis    aspirin 81 MG EC tablet     90 tablet    Take 1 tablet (81 mg) by mouth daily    Hyperlipidemia LDL goal < 100       calcium-vitamin D 600-400 MG-UNIT per tablet    CALTRATE     Take 1 tablet by mouth daily        MULTIVITAMIN/IRON PO      Take by mouth daily 1 daily        Na Sulfate-K Sulfate-Mg Sulf solution    SUPREP BOWEL PREP    2 Bottle    Take 354 mLs (2 Bottles) by mouth See Admin Instructions    Special screening for malignant neoplasms, colon       pantoprazole 40 MG EC tablet    PROTONIX    90 tablet    TAKE 1 TABLET BY MOUTH DAILY 30 TO 60 MINUTES BEFORE A MEAL    Gastroesophageal reflux disease with esophagitis       Vitamin C 500 MG Caps      Take  by mouth. 1 cap daily        VITAMIN D3 PO      Take 2,000 Units by  mouth daily        VITAMIN E      1 tab daily           transverse neck; mandible flap

## 2022-06-22 NOTE — PROGRESS NOTE ADULT - SUBJECTIVE AND OBJECTIVE BOX
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 Patient is POD#0 s/p mandibulectomy, reconstruction of the mandible with transosteal bone implant, Right neck dissection and a left lower leg free flap.      SUBJECTIVE / 24H EVENTS:  Patient seen and examined on morning rounds. No acute events overnight. Patient resting comfortably. Denies any fever, chills, nausea, vomiting.  NGT out. A-Line out. LEVY output: Right Neck - 120cc, Left leg - 10cc. Voided 1.9L to graham. Overnight- NGT was noted to be in the lung on XR, NGT removed, as per PRS - will attempt replacement in AM versus IR consult for placement.     ICU Vital Signs Last 24 Hrs  T(C): 37.2 (22 Jun 2022 08:00), Max: 37.5 (22 Jun 2022 04:00)  T(F): 99 (22 Jun 2022 08:00), Max: 99.5 (22 Jun 2022 04:00)  HR: 69 (22 Jun 2022 08:00) (65 - 98)  BP: 104/70 (22 Jun 2022 08:00) (104/70 - 140/92)  BP(mean): 83 (22 Jun 2022 08:00) (79 - 110)  ABP: 118/100 (22 Jun 2022 01:00) (109/89 - 136/121)  ABP(mean): 102 (22 Jun 2022 01:00) (76 - 140)  RR: 12 (22 Jun 2022 08:00) (10 - 17)  SpO2: 100% (22 Jun 2022 08:00) (96% - 100%)      PHYSICAL EXAM:  Gen: AAOx3, NAD, Communicative  Neck: Incision c/d/i. LEVY SS. Neck soft/flat. No erythema or focal swelling. Intra-oral flap warm with strong doppler signal. Hemostatic.   Ext: L FFF donor site Ace wrapped.         LAB VALUES:  06-22    134<L>  |  100  |  5<L>  ----------------------------<  194<H>  4.2   |  21<L>  |  0.53    Ca    8.0<L>      22 Jun 2022 01:16  Phos  2.3     06-22  Mg     2.50     06-22                                 11.1   14.70 )-----------( 203      ( 22 Jun 2022 01:16 )             33.3       PT/INR - ( 21 Jun 2022 18:45 )   PT: 13.9 sec;   INR: 1.20 ratio         PTT - ( 21 Jun 2022 18:45 )  PTT:28.1 sec  ABG - ( 22 Jun 2022 00:52 )  pH, Arterial: 7.39  pH, Blood: x     /  pCO2: 35    /  pO2: 110   / HCO3: 21    / Base Excess: -3.2  /  SaO2: 99.3

## 2022-06-22 NOTE — PHYSICAL THERAPY INITIAL EVALUATION ADULT - DID THE PATIENT HAVE SURGERY?
Free flap, fibula MANDIBULECTOMY, RECONSTRUCTION OF THE MANDIBLE WITH TRANSOSTEAL BONE PLATE, REMOVAL OF IMPLANT DEEP./yes

## 2022-06-22 NOTE — PHYSICAL THERAPY INITIAL EVALUATION ADULT - LIGHT TOUCH SENSATION, RUE, REHAB EVAL
----- Message from Keyyolis Cevallos [phone]  Requested high priority    Thanks  olya   within normal limits

## 2022-06-22 NOTE — PROGRESS NOTE ADULT - SUBJECTIVE AND OBJECTIVE BOX
SUBJECTIVE:  6/22: NGT removed yesterday. [place] removed. No issues overnight.    Vital Signs Last 24 Hrs  T(C): 37.5 (22 Jun 2022 04:00), Max: 37.5 (22 Jun 2022 04:00)  T(F): 99.5 (22 Jun 2022 04:00), Max: 99.5 (22 Jun 2022 04:00)  HR: 70 (22 Jun 2022 07:00) (65 - 98)  BP: 112/76 (22 Jun 2022 07:00) (110/66 - 140/92)  BP(mean): 87 (22 Jun 2022 07:00) (79 - 110)  RR: 14 (22 Jun 2022 07:00) (10 - 17)  SpO2: 100% (22 Jun 2022 07:00) (96% - 100%)    PE:  General: NAD, awake, alert, oriented  Neck: Incision c/d/i. LEVY SS. Neck soft/flat. No erythema or focal swelling.  Ext: L FFF donor site Ace wrapped.     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     - Admitted to OMFS  - ERAS flap protocol  - Appreciate SICU care  - Rest of care per OMFS  - Flap and donor site care per PRS  - Recommend out of bed to chair, graham removal, NGT replacement per OMFS/PRS

## 2022-06-22 NOTE — PROGRESS NOTE ADULT - ASSESSMENT
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   - ERAS protocol  - Consult nutrition for tube feeds. Overnight- NGT was noted to be in the lung on XR, NGT removed, as per PRS - will attempt replacement in AM versus IR consult for placement.   - Flap and donor site care per PRS.   - Appreciate SICU Care      OMFS [id number]

## 2022-06-22 NOTE — PROGRESS NOTE ADULT - SUBJECTIVE AND OBJECTIVE BOX
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 Patient is POD#0 s/p mandibulectomy, reconstruction of the mandible with transosteal bone implant, Right neck dissection and a left lower leg free flap.      ALLERGIES:  No Known Allergies      --------------------------------------------------------------------------------------    MEDICATIONS:    Neurologic Medications  acetaminophen   IVPB .. 975 milliGRAM(s) IV Intermittent every 6 hours  gabapentin Solution 600 milliGRAM(s) Oral daily  HYDROmorphone  Injectable 0.25 milliGRAM(s) IV Push every 4 hours PRN Moderate Pain (4 - 6)  HYDROmorphone  Injectable 0.5 milliGRAM(s) IV Push every 4 hours PRN Severe Pain (7 - 10)  ondansetron Injectable 4 milliGRAM(s) IV Push every 8 hours PRN Nausea and/or Vomiting    Respiratory Medications    Cardiovascular Medications    Gastrointestinal Medications  dextrose 5%. 1000 milliLiter(s) IV Continuous <Continuous>  lactated ringers. 1000 milliLiter(s) IV Continuous <Continuous>  pantoprazole  Injectable 40 milliGRAM(s) IV Push daily  senna 2 Tablet(s) Oral at bedtime    Genitourinary Medications    Hematologic/Oncologic Medications  enoxaparin Injectable 40 milliGRAM(s) SubCutaneous every 24 hours    Antimicrobial/Immunologic Medications  ampicillin/sulbactam  IVPB 1.5 Gram(s) IV Intermittent every 6 hours    Endocrine/Metabolic Medications  dexAMETHasone  IVPB 8 milliGRAM(s) IV Intermittent every 8 hours  dextrose 50% Injectable 25 Gram(s) IV Push once  dextrose 50% Injectable 12.5 Gram(s) IV Push once  dextrose 50% Injectable 25 Gram(s) IV Push once  glucagon  Injectable 1 milliGRAM(s) IntraMuscular once  insulin lispro (ADMELOG) corrective regimen sliding scale   SubCutaneous three times a day before meals    Topical/Other Medications    --------------------------------------------------------------------------------------    VITAL SIGNS:    --------------------------------------------------------------------------------------    INS AND OUTS:  ICU Vital Signs Last 24 Hrs  T(C): 36.4 (21 Jun 2022 18:45), Max: 37.1 (21 Jun 2022 06:58)  T(F): 97.5 (21 Jun 2022 18:45), Max: 98.8 (21 Jun 2022 06:58)  HR: 67 (21 Jun 2022 23:00) (66 - 88)  BP: 122/71 (21 Jun 2022 23:00) (114/61 - 139/88)  BP(mean): 87 (21 Jun 2022 23:00) (79 - 110)  ABP: 117/93 (21 Jun 2022 23:00) (109/89 - 136/121)  ABP(mean): 94 (21 Jun 2022 23:00) (76 - 140)  RR: 12 (21 Jun 2022 23:00) (12 - 18)  SpO2: 100% (21 Jun 2022 23:00) (96% - 100%)    --------------------------------------------------------------------------------------    EXAM  NEUROLOGY  RASS:   	GCS:    Exam: Normal, in no acute distress.  Alert and oriented x4.  No focal neurologic deficits. ***    HEENT  Exam: Normocephalic, atraumatic, EOMI.  ***    RESPIRATORY  Exam: Lungs clear to auscultation, Normal expansion/effort. ***  Mechanical Ventilation:     CARDIOVASCULAR  Exam: S1, S2.  Regular rate and rhythm.   ***    GI/NUTRITION  Exam: Abdomen soft, Non-tender, Non-distended.  ***  Wound:  ***  Current Diet: NPO***    VASCULAR  Exam: Extremities warm, pink, well-perfused. ***    MUSCULOSKELETAL  Exam: All extremities moving spontaneously without limitations. ***    SKIN  Exam: Good skin turgor, no skin breakdown. ***    METABOLIC / FLUIDS / ELECTROLYTES  dextrose 5%. 1000 milliLiter(s) IV Continuous <Continuous>  lactated ringers. 1000 milliLiter(s) IV Continuous <Continuous>      HEMATOLOGIC  [x] VTE Prophylaxis: enoxaparin Injectable 40 milliGRAM(s) SubCutaneous every 24 hours    Transfusions:	[] PRBC	[] Platelets		[] FFP	[] Cryoprecipitate    INFECTIOUS DISEASE  Antimicrobials/Immunologic Medications:  ampicillin/sulbactam  IVPB 1.5 Gram(s) IV Intermittent every 6 hours    Day #      of     ***    TUBES / LINES / DRAINS  ***  [x] Peripheral IV  [] Central Venous Line     	[] R	[] L	[] IJ	[] Fem	[] SC	Date Placed:   [] Arterial Line		[] R	[] L	[] Fem	[] Rad	[] Ax	Date Placed:   [] PICC		[] Midline		[] Mediport  [] Urinary Catheter		Date Placed:   [x] Necessity of urinary, arterial, and venous catheters discussed    --------------------------------------------------------------------------------------    LABS    CBC (06-21 @ 18:45)                              11.1<L>                         14.85<H>  )----------------(  217        --    % Neutrophils, --    % Lymphocytes, ANC: --                                  33.3<L>    BMP (06-21 @ 18:45)             140     |  105     |  5<L>  		Ca++ --      Ca 7.9<L>             ---------------------------------( 199<H>		Mg 1.50<L>             4.1     |  18<L>   |  0.51  			Ph 3.6         Coags (06-21 @ 18:45)  aPTT 28.1 / INR 1.20<H> / PT 13.9<H>      ABG (06-21 @ 20:38)     7.30<L> / 42<H> / 81<L> / 21 / -5.5<L> / 97.1%     Lactate:    ABG (06-21 @ 18:45)     7.28<L> / 43<H> / 149<H> / 20<L> / -6.3<L> / 99.3<H>%     Lactate:        --------------------------------------------------------------------------------------    OTHER LABORATORY:     IMAGING STUDIES:   CXR:     ASSESSMENT:  42y Female        PLAN:   ***  NEUROLOGY:    RESPIRATORY:  - Maintain O2 saturation >92%    CARDIOVASCULAR:    GI / NUTRITION:    RENAL / GENITOURINARY:  - Indwelling graham catheter  - Monitor electrolytes and replete PRN  - Continue to monitor strict ins and outs q1 hour    HEMATOLOGIC:    INFECTIOUS DISEASE:  - Currently afebrile with no leukocytosis  - Continue to monitor off antibiotics    ENDOCRINOLOGY:  - No active issues  - Continue to monitor glucose on BMP (goal 140-180)    Disposition: SICU    --------------------------------------------------------------------------------------    Critical Care Diagnoses: SICU Daily Progress Note  =====================================================  Interval/Overnight Events:          HPI:  42 year old American speaking female 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 Patient is POD#0 s/p mandibulectomy, reconstruction of the mandible with transosteal bone implant, Right neck dissection and a left lower leg free flap.      ALLERGIES:  No Known Allergies      --------------------------------------------------------------------------------------    MEDICATIONS:    Neurologic Medications  acetaminophen   IVPB .. 975 milliGRAM(s) IV Intermittent every 6 hours  gabapentin Solution 600 milliGRAM(s) Oral daily  HYDROmorphone  Injectable 0.25 milliGRAM(s) IV Push every 4 hours PRN Moderate Pain (4 - 6)  HYDROmorphone  Injectable 0.5 milliGRAM(s) IV Push every 4 hours PRN Severe Pain (7 - 10)  ondansetron Injectable 4 milliGRAM(s) IV Push every 8 hours PRN Nausea and/or Vomiting    Respiratory Medications    Cardiovascular Medications    Gastrointestinal Medications  dextrose 5%. 1000 milliLiter(s) IV Continuous <Continuous>  lactated ringers. 1000 milliLiter(s) IV Continuous <Continuous>  pantoprazole  Injectable 40 milliGRAM(s) IV Push daily  senna 2 Tablet(s) Oral at bedtime    Genitourinary Medications    Hematologic/Oncologic Medications  enoxaparin Injectable 40 milliGRAM(s) SubCutaneous every 24 hours    Antimicrobial/Immunologic Medications  ampicillin/sulbactam  IVPB 1.5 Gram(s) IV Intermittent every 6 hours    Endocrine/Metabolic Medications  dexAMETHasone  IVPB 8 milliGRAM(s) IV Intermittent every 8 hours  dextrose 50% Injectable 25 Gram(s) IV Push once  dextrose 50% Injectable 12.5 Gram(s) IV Push once  dextrose 50% Injectable 25 Gram(s) IV Push once  glucagon  Injectable 1 milliGRAM(s) IntraMuscular once  insulin lispro (ADMELOG) corrective regimen sliding scale   SubCutaneous three times a day before meals    Topical/Other Medications    --------------------------------------------------------------------------------------    VITAL SIGNS:    --------------------------------------------------------------------------------------    INS AND OUTS:  ICU Vital Signs Last 24 Hrs  T(C): 36.4 (21 Jun 2022 18:45), Max: 37.1 (21 Jun 2022 06:58)  T(F): 97.5 (21 Jun 2022 18:45), Max: 98.8 (21 Jun 2022 06:58)  HR: 67 (21 Jun 2022 23:00) (66 - 88)  BP: 122/71 (21 Jun 2022 23:00) (114/61 - 139/88)  BP(mean): 87 (21 Jun 2022 23:00) (79 - 110)  ABP: 117/93 (21 Jun 2022 23:00) (109/89 - 136/121)  ABP(mean): 94 (21 Jun 2022 23:00) (76 - 140)  RR: 12 (21 Jun 2022 23:00) (12 - 18)  SpO2: 100% (21 Jun 2022 23:00) (96% - 100%)    --------------------------------------------------------------------------------------    EXAM  NEUROLOGY  Exam: Normal, NAD, alert, oriented x 3, no focal deficits.     HEENT  Exam: Incision c/d/i. Steri-strips in place. Good perfusion no hematoma formation. KO tube. LEVY drain in right neck with sanguineous output.     RESPIRATORY  Exam:  Normal expansion/effort. On non-rebreather 2L satting well.     CARDIOVASCULAR  Exam:  Regular rate and rhythm.      GI/NUTRITION  Exam: Abdomen soft, Non-tender, Non-distended.  Current Diet:  NPO     VASCULAR  Exam: Extremities warm, pink, well-perfused.     MUSCULOSKELETAL  Exam: All extremities moving spontaneously without limitations.  LEVY drain in right leg sanguineous output .       METABOLIC / FLUIDS / ELECTROLYTES  dextrose 5%. 1000 milliLiter(s) IV Continuous <Continuous>  lactated ringers. 1000 milliLiter(s) IV Continuous <Continuous>      HEMATOLOGIC  [x] VTE Prophylaxis: enoxaparin Injectable 40 milliGRAM(s) SubCutaneous every 24 hours    Transfusions:	[] PRBC	[] Platelets		[] FFP	[] Cryoprecipitate    INFECTIOUS DISEASE  Antimicrobials/Immunologic Medications:  ampicillin/sulbactam  IVPB 1.5 Gram(s) IV Intermittent every 6 hours    Day #      of     ***    TUBES / LINES / DRAINS  ***  [x] Peripheral IV  [] Central Venous Line     	[] R	[] L	[] IJ	[] Fem	[] SC	Date Placed:   [] Arterial Line		[] R	[] L	[] Fem	[] Rad	[] Ax	Date Placed:   [] PICC		[] Midline		[] Mediport  [x] Urinary Catheter		Date Placed:   [x] Necessity of urinary, arterial, and venous catheters discussed    --------------------------------------------------------------------------------------    LABS    CBC (06-21 @ 18:45)                              11.1<L>                         14.85<H>  )----------------(  217        --    % Neutrophils, --    % Lymphocytes, ANC: --                                  33.3<L>    BMP (06-21 @ 18:45)             140     |  105     |  5<L>  		Ca++ --      Ca 7.9<L>             ---------------------------------( 199<H>		Mg 1.50<L>             4.1     |  18<L>   |  0.51  			Ph 3.6         Coags (06-21 @ 18:45)  aPTT 28.1 / INR 1.20<H> / PT 13.9<H>      ABG (06-21 @ 20:38)     7.30<L> / 42<H> / 81<L> / 21 / -5.5<L> / 97.1%     Lactate:    ABG (06-21 @ 18:45)     7.28<L> / 43<H> / 149<H> / 20<L> / -6.3<L> / 99.3<H>%     Lactate:        --------------------------------------------------------------------------------------    OTHER LABORATORY:     IMAGING STUDIES:   CXR:     ASSESSMENT:  42 year old American speaking female 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 Patient is POD#0 s/p mandibulectomy, reconstruction of the mandible with transosteal bone implant, Right neck dissection and a left lower leg free flap.        PLAN:    NEUROLOGY:    Neurologic:   -q1h flap checks  -Tylenol standing  -Dilaudid PRN   -gabapentin 600mg daily per protocol     Respiratory:   -patient on non-rebreather @2L   -keep O2 sat >92%  -IS    Cardiovascular:   -no active issues  -keep MAP goal >65%    Gastrointestinal/Nutrition:  -diet: NPO   -Protonix  -senna and Miralax      Renal/Genitourinary:   -no active issues  -LR @ 100ml/hour   -graham  -monitor I&Os  -replete electrolytes as necessary    Hematologic:   -SCDs  -restarting chemical dvt px in the AM    Infectious Disease:   -Unasyn for 24 hours per protocol    Tubes/Lines/Drains:   -2 PIVS  -Right radial A line   -2 LEVY drains  -graham    Endocrine:   -no active issues   -ISS    Disposition: SICU      --------------------------------------------------------------------------------------    Critical Care Diagnoses: SICU Daily Progress Note  =====================================================  Interval/Overnight Events:          HPI:  42 year old [place] speaking female 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 Patient is POD#0 s/p mandibulectomy, reconstruction of the mandible with transosteal bone implant, Right neck dissection and a left lower leg free flap.      ALLERGIES:  No Known Allergies      --------------------------------------------------------------------------------------    MEDICATIONS:    Neurologic Medications  acetaminophen   IVPB .. 975 milliGRAM(s) IV Intermittent every 6 hours  gabapentin Solution 600 milliGRAM(s) Oral daily  HYDROmorphone  Injectable 0.25 milliGRAM(s) IV Push every 4 hours PRN Moderate Pain (4 - 6)  HYDROmorphone  Injectable 0.5 milliGRAM(s) IV Push every 4 hours PRN Severe Pain (7 - 10)  ondansetron Injectable 4 milliGRAM(s) IV Push every 8 hours PRN Nausea and/or Vomiting    Respiratory Medications    Cardiovascular Medications    Gastrointestinal Medications  dextrose 5%. 1000 milliLiter(s) IV Continuous <Continuous>  lactated ringers. 1000 milliLiter(s) IV Continuous <Continuous>  pantoprazole  Injectable 40 milliGRAM(s) IV Push daily  senna 2 Tablet(s) Oral at bedtime    Genitourinary Medications    Hematologic/Oncologic Medications  enoxaparin Injectable 40 milliGRAM(s) SubCutaneous every 24 hours    Antimicrobial/Immunologic Medications  ampicillin/sulbactam  IVPB 1.5 Gram(s) IV Intermittent every 6 hours    Endocrine/Metabolic Medications  dexAMETHasone  IVPB 8 milliGRAM(s) IV Intermittent every 8 hours  dextrose 50% Injectable 25 Gram(s) IV Push once  dextrose 50% Injectable 12.5 Gram(s) IV Push once  dextrose 50% Injectable 25 Gram(s) IV Push once  glucagon  Injectable 1 milliGRAM(s) IntraMuscular once  insulin lispro (ADMELOG) corrective regimen sliding scale   SubCutaneous three times a day before meals    Topical/Other Medications    --------------------------------------------------------------------------------------    VITAL SIGNS:    --------------------------------------------------------------------------------------    INS AND OUTS:  ICU Vital Signs Last 24 Hrs  T(C): 36.4 (21 Jun 2022 18:45), Max: 37.1 (21 Jun 2022 06:58)  T(F): 97.5 (21 Jun 2022 18:45), Max: 98.8 (21 Jun 2022 06:58)  HR: 67 (21 Jun 2022 23:00) (66 - 88)  BP: 122/71 (21 Jun 2022 23:00) (114/61 - 139/88)  BP(mean): 87 (21 Jun 2022 23:00) (79 - 110)  ABP: 117/93 (21 Jun 2022 23:00) (109/89 - 136/121)  ABP(mean): 94 (21 Jun 2022 23:00) (76 - 140)  RR: 12 (21 Jun 2022 23:00) (12 - 18)  SpO2: 100% (21 Jun 2022 23:00) (96% - 100%)    --------------------------------------------------------------------------------------    EXAM  NEUROLOGY  Exam: Normal, NAD, alert, oriented x 3, no focal deficits.     HEENT  Exam: Incision c/d/i. Steri-strips in place. Good perfusion no hematoma formation. KO tube. LEVY drain in right neck with sanguineous output.     RESPIRATORY  Exam:  Normal expansion/effort. On non-rebreather 2L satting well.     CARDIOVASCULAR  Exam:  Regular rate and rhythm.      GI/NUTRITION  Exam: Abdomen soft, Non-tender, Non-distended.  Current Diet:  NPO     VASCULAR  Exam: Extremities warm, pink, well-perfused.     MUSCULOSKELETAL  Exam: All extremities moving spontaneously without limitations.  LEVY drain in right leg sanguineous output .       METABOLIC / FLUIDS / ELECTROLYTES  dextrose 5%. 1000 milliLiter(s) IV Continuous <Continuous>  lactated ringers. 1000 milliLiter(s) IV Continuous <Continuous>      HEMATOLOGIC  [x] VTE Prophylaxis: enoxaparin Injectable 40 milliGRAM(s) SubCutaneous every 24 hours    Transfusions:	[] PRBC	[] Platelets		[] FFP	[] Cryoprecipitate    INFECTIOUS DISEASE  Antimicrobials/Immunologic Medications:  ampicillin/sulbactam  IVPB 1.5 Gram(s) IV Intermittent every 6 hours    Day #      of     ***    TUBES / LINES / DRAINS  ***  [x] Peripheral IV  [] Central Venous Line     	[] R	[] L	[] IJ	[] Fem	[] SC	Date Placed:   [] Arterial Line		[] R	[] L	[] Fem	[] Rad	[] Ax	Date Placed:   [] PICC		[] Midline		[] Mediport  [x] Urinary Catheter		Date Placed:   [x] Necessity of urinary, arterial, and venous catheters discussed    --------------------------------------------------------------------------------------    LABS    CBC (06-21 @ 18:45)                              11.1<L>                         14.85<H>  )----------------(  217        --    % Neutrophils, --    % Lymphocytes, ANC: --                                  33.3<L>    BMP (06-21 @ 18:45)             140     |  105     |  5<L>  		Ca++ --      Ca 7.9<L>             ---------------------------------( 199<H>		Mg 1.50<L>             4.1     |  18<L>   |  0.51  			Ph 3.6         Coags (06-21 @ 18:45)  aPTT 28.1 / INR 1.20<H> / PT 13.9<H>      ABG (06-21 @ 20:38)     7.30<L> / 42<H> / 81<L> / 21 / -5.5<L> / 97.1%     Lactate:    ABG (06-21 @ 18:45)     7.28<L> / 43<H> / 149<H> / 20<L> / -6.3<L> / 99.3<H>%     Lactate:        --------------------------------------------------------------------------------------    OTHER LABORATORY:     IMAGING STUDIES:   CXR:     ASSESSMENT:  42 year old [place] speaking female 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 Patient is POD#0 s/p mandibulectomy, reconstruction of the mandible with transosteal bone implant, Right neck dissection and a left lower leg free flap.        PLAN:      Neurologic:   - q1h flap checks  - Tylenol standing  - Dilaudid PRN   - Gabapentin 600mg daily per protocol   - PO Tylenol and oxycodone after ko tube placement    Respiratory:   - Patient on non-rebreather @2L   - Keep O2 sat >92%  - Mobilize patient, OOB  - IS    Cardiovascular:   -no active issues  -keep MAP goal >65%    Gastrointestinal/Nutrition:  - Diet: NPO   - Place ko tube  - Begin tube feeds  - Protonix  - Senna and Miralax      Renal/Genitourinary:   - No active issues  - LR @ 100ml/hour   - Remove Mireles  - Monitor I&Os  - Replete electrolytes as necessary    Hematologic:   - SCDs  - Restarting chemical dvt px in the AM    Infectious Disease:   - Unasyn for 24 hours per protocol    Tubes/Lines/Drains:   - 2 PIVS  - Right radial A line   - 2 LEVY drains  - Mireles    Endocrine:   - No active issues   - ISS    Disposition: SICU      --------------------------------------------------------------------------------------    Critical Care Diagnoses:

## 2022-06-22 NOTE — PHYSICAL THERAPY INITIAL EVALUATION ADULT - PERTINENT HX OF CURRENT PROBLEM, REHAB EVAL
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

## 2022-06-22 NOTE — PATIENT PROFILE ADULT - NSPROGENOTHERPROVIDER_GEN_A_NUR
Scheduled procedure with patient in room  Scheduled Via: Case Creation for [facility]   Procedure date: 7/12   Procedure time: 10 AM     Arrival Time 9AM  Location :[facility]  Insurance confirmed as Payor: [employer] / Plan: [facility] / Product Type: MEDADV , will be the same at time of procedure: Yes   The following have been confirmed:   Latex Allergy No   Diabetic No   Insulin No  CGM/Pump? No - Will need to be removed for procedure  Sleep Apnea No   Defibrillator No If yes, cannot be done at ASC  Pacemaker No   Bloodthinners / ASA No Hold infoHold DICLOFENAC for 24 hours prior to procedure and 24 hours following.        6/22 No letter mail to pt, patient was scheduled in office, patient  aware and understand prep,pt given arrival time and date in office        Follow Up appointment scheduled for 8/4 at 9 AM at [facility] with Precious Mao           none

## 2022-06-22 NOTE — PATIENT PROFILE ADULT - FALL HARM RISK - HARM RISK INTERVENTIONS

## 2022-06-23 LAB
ANION GAP SERPL CALC-SCNC: 9 MMOL/L — SIGNIFICANT CHANGE UP (ref 7–14)
BUN SERPL-MCNC: 9 MG/DL — SIGNIFICANT CHANGE UP (ref 7–23)
CALCIUM SERPL-MCNC: 8.1 MG/DL — LOW (ref 8.4–10.5)
CHLORIDE SERPL-SCNC: 104 MMOL/L — SIGNIFICANT CHANGE UP (ref 98–107)
CO2 SERPL-SCNC: 25 MMOL/L — SIGNIFICANT CHANGE UP (ref 22–31)
CREAT SERPL-MCNC: 0.51 MG/DL — SIGNIFICANT CHANGE UP (ref 0.5–1.3)
EGFR: 118 ML/MIN/1.73M2 — SIGNIFICANT CHANGE UP
GLUCOSE BLDC GLUCOMTR-MCNC: 103 MG/DL — HIGH (ref 70–99)
GLUCOSE BLDC GLUCOMTR-MCNC: 150 MG/DL — HIGH (ref 70–99)
GLUCOSE BLDC GLUCOMTR-MCNC: 161 MG/DL — HIGH (ref 70–99)
GLUCOSE BLDC GLUCOMTR-MCNC: 166 MG/DL — HIGH (ref 70–99)
GLUCOSE BLDC GLUCOMTR-MCNC: 98 MG/DL — SIGNIFICANT CHANGE UP (ref 70–99)
GLUCOSE SERPL-MCNC: 168 MG/DL — HIGH (ref 70–99)
HCT VFR BLD CALC: 32.2 % — LOW (ref 34.5–45)
HGB BLD-MCNC: 10.6 G/DL — LOW (ref 11.5–15.5)
MAGNESIUM SERPL-MCNC: 2.3 MG/DL — SIGNIFICANT CHANGE UP (ref 1.6–2.6)
MCHC RBC-ENTMCNC: 30.7 PG — SIGNIFICANT CHANGE UP (ref 27–34)
MCHC RBC-ENTMCNC: 32.9 GM/DL — SIGNIFICANT CHANGE UP (ref 32–36)
MCV RBC AUTO: 93.3 FL — SIGNIFICANT CHANGE UP (ref 80–100)
NRBC # BLD: 0 /100 WBCS — SIGNIFICANT CHANGE UP
NRBC # FLD: 0 K/UL — SIGNIFICANT CHANGE UP
PHOSPHATE SERPL-MCNC: 2.4 MG/DL — LOW (ref 2.5–4.5)
PLATELET # BLD AUTO: 220 K/UL — SIGNIFICANT CHANGE UP (ref 150–400)
POTASSIUM SERPL-MCNC: 4.1 MMOL/L — SIGNIFICANT CHANGE UP (ref 3.5–5.3)
POTASSIUM SERPL-SCNC: 4.1 MMOL/L — SIGNIFICANT CHANGE UP (ref 3.5–5.3)
RBC # BLD: 3.45 M/UL — LOW (ref 3.8–5.2)
RBC # FLD: 12.8 % — SIGNIFICANT CHANGE UP (ref 10.3–14.5)
SODIUM SERPL-SCNC: 138 MMOL/L — SIGNIFICANT CHANGE UP (ref 135–145)
WBC # BLD: 16.61 K/UL — HIGH (ref 3.8–10.5)
WBC # FLD AUTO: 16.61 K/UL — HIGH (ref 3.8–10.5)

## 2022-06-23 PROCEDURE — 71045 X-RAY EXAM CHEST 1 VIEW: CPT | Mod: 26

## 2022-06-23 PROCEDURE — 99231 SBSQ HOSP IP/OBS SF/LOW 25: CPT | Mod: 24,GC

## 2022-06-23 RX ORDER — ONDANSETRON 8 MG/1
4 TABLET, FILM COATED ORAL ONCE
Refills: 0 | Status: COMPLETED | OUTPATIENT
Start: 2022-06-23 | End: 2022-06-23

## 2022-06-23 RX ORDER — ACETAMINOPHEN 500 MG
975 TABLET ORAL EVERY 6 HOURS
Refills: 0 | Status: DISCONTINUED | OUTPATIENT
Start: 2022-06-23 | End: 2022-06-28

## 2022-06-23 RX ORDER — OXYCODONE HYDROCHLORIDE 5 MG/1
5 TABLET ORAL EVERY 6 HOURS
Refills: 0 | Status: DISCONTINUED | OUTPATIENT
Start: 2022-06-23 | End: 2022-06-28

## 2022-06-23 RX ORDER — OXYCODONE HYDROCHLORIDE 5 MG/1
10 TABLET ORAL EVERY 6 HOURS
Refills: 0 | Status: DISCONTINUED | OUTPATIENT
Start: 2022-06-23 | End: 2022-06-28

## 2022-06-23 RX ADMIN — CHLORHEXIDINE GLUCONATE 15 MILLILITER(S): 213 SOLUTION TOPICAL at 17:09

## 2022-06-23 RX ADMIN — Medication 63.75 MILLIMOLE(S): at 03:47

## 2022-06-23 RX ADMIN — HYDROMORPHONE HYDROCHLORIDE 0.25 MILLIGRAM(S): 2 INJECTION INTRAMUSCULAR; INTRAVENOUS; SUBCUTANEOUS at 11:48

## 2022-06-23 RX ADMIN — Medication 400 MILLIGRAM(S): at 05:13

## 2022-06-23 RX ADMIN — Medication 1000 MILLIGRAM(S): at 00:14

## 2022-06-23 RX ADMIN — HYDROMORPHONE HYDROCHLORIDE 0.25 MILLIGRAM(S): 2 INJECTION INTRAMUSCULAR; INTRAVENOUS; SUBCUTANEOUS at 10:09

## 2022-06-23 RX ADMIN — ONDANSETRON 4 MILLIGRAM(S): 8 TABLET, FILM COATED ORAL at 16:54

## 2022-06-23 RX ADMIN — CHLORHEXIDINE GLUCONATE 15 MILLILITER(S): 213 SOLUTION TOPICAL at 05:15

## 2022-06-23 RX ADMIN — Medication 2: at 01:00

## 2022-06-23 RX ADMIN — Medication 2: at 05:19

## 2022-06-23 RX ADMIN — Medication 1000 MILLIGRAM(S): at 05:30

## 2022-06-23 NOTE — DIETITIAN INITIAL EVALUATION ADULT - NS FNS DIET ORDER
Diet, NPO with Tube Feed:   Tube Feeding Modality: Nasogastric  Glucerna 1.2 Tarun (GLUCERNARTH)  Total Volume for 24 Hours (mL): 1080  Continuous  Starting Tube Feed Rate {mL per Hour}: 20     Every 4 hours  Until Goal Tube Feed Rate (mL per Hour): 45  Tube Feed Duration (in Hours): 24  Tube Feed Start Time: 11:00 (06-23-22 @ 10:37)

## 2022-06-23 NOTE — PROGRESS NOTE ADULT - SUBJECTIVE AND OBJECTIVE BOX
ORAL MAXILLOFACIAL SURGERY  Pager: [phone]    STATUS POST:  Mandibulectomy, reconstruction of the mandible with transosteal bone implant, Right neck dissection and a left lower leg free flap.   POST OPERATIVE DAY #2      INTERVAL EVENTS/SUBJECTIVE:  No acute events overnight. Pain is well-controlled. KO tube self-removed overnight. Pain is well-controlled. Denies dyspnea, malaise, subjective fever, or chills.    ______________________________________________  OBJECTIVE:   T(C): 36.9 (06-23-22 @ 04:00), Max: 37.6 (06-22-22 @ 12:00)  HR: 84 (06-23-22 @ 07:00) (64 - 110)  BP: 114/75 (06-23-22 @ 07:00) (104/70 - 141/77)  RR: 15 (06-23-22 @ 07:00) (12 - 23)  SpO2: 98% (06-23-22 @ 07:00) (97% - 100%)  Wt(kg): --  CAPILLARY BLOOD GLUCOSE      POCT Blood Glucose.: 161 mg/dL (23 Jun 2022 05:17)  POCT Blood Glucose.: 166 mg/dL (23 Jun 2022 00:51)  POCT Blood Glucose.: 142 mg/dL (22 Jun 2022 18:32)  POCT Blood Glucose.: 180 mg/dL (22 Jun 2022 11:09)    I&O's Detail    22 Jun 2022 07:01  -  23 Jun 2022 07:00  --------------------------------------------------------  IN:    dextrose 5% + lactated ringers: 1100 mL    dextrose 5% + lactated ringers: 400 mL    IV PiggyBack: 550 mL  Total IN: 2050 mL    OUT:    Bulb (mL): 60 mL    Bulb (mL): 60 mL    Indwelling Catheter - Urethral (mL): 200 mL    Voided (mL): 700 mL  Total OUT: 1020 mL    Total NET: 1030 mL          Physical exam:  Gen: AAOx3, NAD, Communicative  Neck: Incision c/d/i. LEVY SS. Neck soft/flat. No erythema or focal swelling. Intra-oral flap warm with strong doppler signal. Hemostatic.   Ext: L FFF donor site Ace wrapped.   ______________________________________________  LABS:  CBC Full  -  ( 23 Jun 2022 00:49 )  WBC Count : 16.61 K/uL  RBC Count : 3.45 M/uL  Hemoglobin : 10.6 g/dL  Hematocrit : 32.2 %  Platelet Count - Automated : 220 K/uL  Mean Cell Volume : 93.3 fL  Mean Cell Hemoglobin : 30.7 pg  Mean Cell Hemoglobin Concentration : 32.9 gm/dL  Auto Neutrophil # : x  Auto Lymphocyte # : x  Auto Monocyte # : x  Auto Eosinophil # : x  Auto Basophil # : x  Auto Neutrophil % : x  Auto Lymphocyte % : x  Auto Monocyte % : x  Auto Eosinophil % : x  Auto Basophil % : x    06-23    138  |  104  |  9   ----------------------------<  168<H>  4.1   |  25  |  0.51    Ca    8.1<L>      23 Jun 2022 00:49  Phos  2.4     06-23  Mg     2.30     06-23      _____________________________________________  RADIOLOGY:

## 2022-06-23 NOTE — PROGRESS NOTE ADULT - SUBJECTIVE AND OBJECTIVE BOX
SICU Daily Progress Note  =====================================================  Interval/Overnight Events:     ***    POD #          	SICU Day #    ***    HPI:  ((insert from previous note)) ***    PMH:   ***    ALLERGIES:  No Known Allergies      --------------------------------------------------------------------------------------    MEDICATIONS:    Neurologic Medications  acetaminophen   IVPB .. 1000 milliGRAM(s) IV Intermittent every 6 hours  HYDROmorphone  Injectable 0.5 milliGRAM(s) IV Push every 4 hours PRN Severe Pain (7 - 10)  HYDROmorphone  Injectable 0.25 milliGRAM(s) IV Push every 4 hours PRN Moderate Pain (4 - 6)    Respiratory Medications    Cardiovascular Medications    Gastrointestinal Medications  dextrose 5% + lactated ringers. 1000 milliLiter(s) IV Continuous <Continuous>    Genitourinary Medications    Hematologic/Oncologic Medications  enoxaparin Injectable 40 milliGRAM(s) SubCutaneous every 24 hours    Antimicrobial/Immunologic Medications    Endocrine/Metabolic Medications  dextrose 50% Injectable 25 Gram(s) IV Push once  dextrose 50% Injectable 12.5 Gram(s) IV Push once  dextrose 50% Injectable 25 Gram(s) IV Push once  insulin lispro (ADMELOG) corrective regimen sliding scale   SubCutaneous every 6 hours    Topical/Other Medications  chlorhexidine 0.12% Liquid 15 milliLiter(s) Swish and Spit two times a day    --------------------------------------------------------------------------------------    VITAL SIGNS:    --------------------------------------------------------------------------------------    INS AND OUTS:  ((Insert SICU Vitals/Is+Os here))***  --------------------------------------------------------------------------------------    EXAM  NEUROLOGY  RASS:   	GCS:    Exam: Normal, in no acute distress.  Alert and oriented x4.  No focal neurologic deficits. ***    HEENT  Exam: Normocephalic, atraumatic, EOMI.  ***    RESPIRATORY  Exam: Lungs clear to auscultation, Normal expansion/effort. ***  Mechanical Ventilation:     CARDIOVASCULAR  Exam: S1, S2.  Regular rate and rhythm.   ***    GI/NUTRITION  Exam: Abdomen soft, Non-tender, Non-distended.  ***  Wound:  ***  Current Diet: NPO***    VASCULAR  Exam: Extremities warm, pink, well-perfused. ***    MUSCULOSKELETAL  Exam: All extremities moving spontaneously without limitations. ***    SKIN  Exam: Good skin turgor, no skin breakdown. ***    METABOLIC / FLUIDS / ELECTROLYTES  dextrose 5% + lactated ringers. 1000 milliLiter(s) IV Continuous <Continuous>      HEMATOLOGIC  [x] VTE Prophylaxis: enoxaparin Injectable 40 milliGRAM(s) SubCutaneous every 24 hours    Transfusions:	[] PRBC	[] Platelets		[] FFP	[] Cryoprecipitate    INFECTIOUS DISEASE  Antimicrobials/Immunologic Medications:    Day #      of     ***    TUBES / LINES / DRAINS  ***  [x] Peripheral IV  [] Central Venous Line     	[] R	[] L	[] IJ	[] Fem	[] SC	Date Placed:   [] Arterial Line		[] R	[] L	[] Fem	[] Rad	[] Ax	Date Placed:   [] PICC		[] Midline		[] Mediport  [] Urinary Catheter		Date Placed:   [x] Necessity of urinary, arterial, and venous catheters discussed    --------------------------------------------------------------------------------------    LABS    ((Insert SICU Labs here))***  --------------------------------------------------------------------------------------    OTHER LABORATORY:     IMAGING STUDIES:   CXR:     ASSESSMENT:  42y Female        PLAN:   NEUROLOGY:  -A&Ox3  -pain control: tylenol standing, dilaudid prn  -OOB    RESPIRATORY:  -on room air  - Maintain O2 saturation >92%  -OOB/ISS    CARDIOVASCULAR:  -MAP >65  -no active issues    GI / NUTRITION:  -patient self d/cd KO tube, plastics aware will place in AM  -diet: NPO    RENAL / GENITOURINARY:  - Indwelling graham catheter  - Monitor electrolytes and replete PRN  - Continue to monitor strict ins and outs q1 hour    HEMATOLOGIC:    INFECTIOUS DISEASE:  - Currently afebrile with no leukocytosis  - Continue to monitor off antibiotics    ENDOCRINOLOGY:  - No active issues  - Continue to monitor glucose on BMP (goal 140-180)    Disposition: SICU    --------------------------------------------------------------------------------------    Critical Care Diagnoses: SICU Daily Progress Note  =====================================================  Interval/Overnight Events:    -patient placed on D5/LR @ 100ml/hour  -KO tube placement per primary team    POD #2          	SICU Day #   2    HPI:   42 year old Vincentian speaking female 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 Patient is POD#2 s/p mandibulectomy, reconstruction of the mandible with transosteal bone implant, Right neck dissection and a left lower leg free flap.        ALLERGIES:  No Known Allergies      --------------------------------------------------------------------------------------    MEDICATIONS:    Neurologic Medications  acetaminophen   IVPB .. 1000 milliGRAM(s) IV Intermittent every 6 hours  HYDROmorphone  Injectable 0.5 milliGRAM(s) IV Push every 4 hours PRN Severe Pain (7 - 10)  HYDROmorphone  Injectable 0.25 milliGRAM(s) IV Push every 4 hours PRN Moderate Pain (4 - 6)    Respiratory Medications    Cardiovascular Medications    Gastrointestinal Medications  dextrose 5% + lactated ringers. 1000 milliLiter(s) IV Continuous <Continuous>    Genitourinary Medications    Hematologic/Oncologic Medications  enoxaparin Injectable 40 milliGRAM(s) SubCutaneous every 24 hours    Antimicrobial/Immunologic Medications    Endocrine/Metabolic Medications  dextrose 50% Injectable 25 Gram(s) IV Push once  dextrose 50% Injectable 12.5 Gram(s) IV Push once  dextrose 50% Injectable 25 Gram(s) IV Push once  insulin lispro (ADMELOG) corrective regimen sliding scale   SubCutaneous every 6 hours    Topical/Other Medications  chlorhexidine 0.12% Liquid 15 milliLiter(s) Swish and Spit two times a day    --------------------------------------------------------------------------------------    VITAL SIGNS:    --------------------------------------------------------------------------------------    INS AND OUTS:  ICU Vital Signs Last 24 Hrs  T(C): 36.8 (23 Jun 2022 00:00), Max: 37.6 (22 Jun 2022 12:00)  T(F): 98.3 (23 Jun 2022 00:00), Max: 99.6 (22 Jun 2022 12:00)  HR: 64 (23 Jun 2022 02:00) (64 - 110)  BP: 127/80 (23 Jun 2022 01:00) (104/70 - 141/77)  BP(mean): 96 (23 Jun 2022 01:00) (80 - 99)  ABP: --  ABP(mean): --  RR: 12 (23 Jun 2022 02:00) (11 - 23)  SpO2: 100% (23 Jun 2022 02:00) (97% - 100%)    --------------------------------------------------------------------------------------    EXAM  NEUROLOGY  Exam: Normal, in no acute distress.  Alert and oriented x4.  No focal neurologic deficits.     RESPIRATORY  Exam:  Normal expansion/effort.    HEENT:  Exam: Neck incision with steri-strips c/d/i with staples, soft no hematoma. Flap with good doppler signal. Right sided J drain with sanguineous output.      CARDIOVASCULAR  Exam:  Regular rate and rhythm.     GI/NUTRITION  Exam: Abdomen soft, Non-tender, Non-distended.    Current Diet: NPO    VASCULAR  Exam: Extremities warm, pink, well-perfused. LEVY drain on left thigh with ace wrap, sanguineous output.       METABOLIC / FLUIDS / ELECTROLYTES  dextrose 5% + lactated ringers. 1000 milliLiter(s) IV Continuous <Continuous>      HEMATOLOGIC  [x] VTE Prophylaxis: enoxaparin Injectable 40 milliGRAM(s) SubCutaneous every 24 hours    Transfusions:	[] PRBC	[] Platelets		[] FFP	[] Cryoprecipitate    INFECTIOUS DISEASE  Antimicrobials/Immunologic Medications:    Day #      of     ***    TUBES / LINES / DRAINS  ***  [x] Peripheral IV  [] Central Venous Line     	[] R	[] L	[] IJ	[] Fem	[] SC	Date Placed:   [] Arterial Line		[] R	[] L	[] Fem	[] Rad	[] Ax	Date Placed:   [] PICC		[] Midline		[] Mediport  [] Urinary Catheter		Date Placed:   [x] Necessity of urinary, arterial, and venous catheters discussed    --------------------------------------------------------------------------------------    LABS  CBC (06-23 @ 00:49)                              10.6<L>                         16.61<H>  )----------------(  220        --    % Neutrophils, --    % Lymphocytes, ANC: --                                  32.2<L>  CBC (06-22 @ 01:16)                              11.1<L>                         14.70<H>  )----------------(  203        --    % Neutrophils, --    % Lymphocytes, ANC: --                                  33.3<L>    BMP (06-23 @ 00:49)             138     |  104     |  9     		Ca++ --      Ca 8.1<L>             ---------------------------------( 168<H>		Mg 2.30               4.1     |  25      |  0.51  			Ph 2.4<L>  BMP (06-22 @ 01:16)             134<L>  |  100     |  5<L>  		Ca++ --      Ca 8.0<L>             ---------------------------------( 194<H>		Mg 2.50               4.2     |  21<L>   |  0.53  			Ph 2.3<L>      Coags (06-21 @ 18:45)  aPTT 28.1 / INR 1.20<H> / PT 13.9<H>      ABG (06-22 @ 00:52)     7.39 / 35 / 110<H> / 21 / -3.2<L> / 99.3<H>%     Lactate:    ABG (06-21 @ 20:38)     7.30<L> / 42<H> / 81<L> / 21 / -5.5<L> / 97.1%     Lactate:        --------------------------------------------------------------------------------------    OTHER LABORATORY:     IMAGING STUDIES:   CXR:     ASSESSMENT:   42 year old Vincent[place] speaking female 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 Patient is POD#2 s/p mandibulectomy, reconstruction of the mandible with transosteal bone implant, Right neck dissection and a left lower leg free flap.    PLAN:   NEUROLOGY:  -A&Ox3  -pain control: tylenol standing, dilaudid prn  -OOB    RESPIRATORY:  -on room air  - Maintain O2 saturation >92%  -OOB/ISS    CARDIOVASCULAR:  -MAP >65  -no active issues    GI / NUTRITION:  -patient self d/cd KO tube (6/21)  -KO tube placement per primary team  -diet: NPO  -protonix  -senna & miralax    RENAL / GENITOURINARY:  -D5/LR @100  - Monitor electrolytes and replete PRN  - Continue to monitor strict ins and outs q1 hour    HEMATOLOGIC:  -H&H stable  -SCDs  -DVT px: lovenox daily     INFECTIOUS DISEASE:  - Currently afebrile with no leukocytosis  - Continue to monitor off antibiotics    ENDOCRINOLOGY:  - No active issues  - Continue to monitor glucose on BMP (goal 140-180)    Disposition: SICU    --------------------------------------------------------------------------------------    Critical Care Diagnoses: SICU Daily Progress Note  =====================================================  Interval/Overnight Events:    -q2h flap checks per primary team  -patient placed on D5/LR @ 100ml/hour  -KO tube placement per primary team    POD #2          	SICU Day #   2    HPI:   42 year old [place]ian speaking female 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 Patient is POD#2 s/p mandibulectomy, reconstruction of the mandible with transosteal bone implant, Right neck dissection and a left lower leg free flap.        ALLERGIES:  No Known Allergies      --------------------------------------------------------------------------------------    MEDICATIONS:    Neurologic Medications  acetaminophen   IVPB .. 1000 milliGRAM(s) IV Intermittent every 6 hours  HYDROmorphone  Injectable 0.5 milliGRAM(s) IV Push every 4 hours PRN Severe Pain (7 - 10)  HYDROmorphone  Injectable 0.25 milliGRAM(s) IV Push every 4 hours PRN Moderate Pain (4 - 6)    Respiratory Medications    Cardiovascular Medications    Gastrointestinal Medications  dextrose 5% + lactated ringers. 1000 milliLiter(s) IV Continuous <Continuous>    Genitourinary Medications    Hematologic/Oncologic Medications  enoxaparin Injectable 40 milliGRAM(s) SubCutaneous every 24 hours    Antimicrobial/Immunologic Medications    Endocrine/Metabolic Medications  dextrose 50% Injectable 25 Gram(s) IV Push once  dextrose 50% Injectable 12.5 Gram(s) IV Push once  dextrose 50% Injectable 25 Gram(s) IV Push once  insulin lispro (ADMELOG) corrective regimen sliding scale   SubCutaneous every 6 hours    Topical/Other Medications  chlorhexidine 0.12% Liquid 15 milliLiter(s) Swish and Spit two times a day    --------------------------------------------------------------------------------------    VITAL SIGNS:    --------------------------------------------------------------------------------------    INS AND OUTS:  ICU Vital Signs Last 24 Hrs  T(C): 36.8 (23 Jun 2022 00:00), Max: 37.6 (22 Jun 2022 12:00)  T(F): 98.3 (23 Jun 2022 00:00), Max: 99.6 (22 Jun 2022 12:00)  HR: 64 (23 Jun 2022 02:00) (64 - 110)  BP: 127/80 (23 Jun 2022 01:00) (104/70 - 141/77)  BP(mean): 96 (23 Jun 2022 01:00) (80 - 99)  ABP: --  ABP(mean): --  RR: 12 (23 Jun 2022 02:00) (11 - 23)  SpO2: 100% (23 Jun 2022 02:00) (97% - 100%)    --------------------------------------------------------------------------------------    EXAM  NEUROLOGY  Exam: Normal, in no acute distress.  Alert and oriented x4.  No focal neurologic deficits.     RESPIRATORY  Exam:  Normal expansion/effort.    HEENT:  Exam: Neck incision with steri-strips c/d/i with staples, soft no hematoma. Flap with good doppler signal. Right sided J drain with sanguineous output.      CARDIOVASCULAR  Exam:  Regular rate and rhythm.     GI/NUTRITION  Exam: Abdomen soft, Non-tender, Non-distended.    Current Diet: NPO    VASCULAR  Exam: Extremities warm, pink, well-perfused. LEVY drain on left thigh with ace wrap, sanguineous output.       METABOLIC / FLUIDS / ELECTROLYTES  dextrose 5% + lactated ringers. 1000 milliLiter(s) IV Continuous <Continuous>      HEMATOLOGIC  [x] VTE Prophylaxis: enoxaparin Injectable 40 milliGRAM(s) SubCutaneous every 24 hours    Transfusions:	[] PRBC	[] Platelets		[] FFP	[] Cryoprecipitate    INFECTIOUS DISEASE  Antimicrobials/Immunologic Medications:    Day #      of     ***    TUBES / LINES / DRAINS  ***  [x] Peripheral IV  [] Central Venous Line     	[] R	[] L	[] IJ	[] Fem	[] SC	Date Placed:   [] Arterial Line		[] R	[] L	[] Fem	[] Rad	[] Ax	Date Placed:   [] PICC		[] Midline		[] Mediport  [] Urinary Catheter		Date Placed:   [x] Necessity of urinary, arterial, and venous catheters discussed    --------------------------------------------------------------------------------------    LABS  CBC (06-23 @ 00:49)                              10.6<L>                         16.61<H>  )----------------(  220        --    % Neutrophils, --    % Lymphocytes, ANC: --                                  32.2<L>  CBC (06-22 @ 01:16)                              11.1<L>                         14.70<H>  )----------------(  203        --    % Neutrophils, --    % Lymphocytes, ANC: --                                  33.3<L>    BMP (06-23 @ 00:49)             138     |  104     |  9     		Ca++ --      Ca 8.1<L>             ---------------------------------( 168<H>		Mg 2.30               4.1     |  25      |  0.51  			Ph 2.4<L>  BMP (06-22 @ 01:16)             134<L>  |  100     |  5<L>  		Ca++ --      Ca 8.0<L>             ---------------------------------( 194<H>		Mg 2.50               4.2     |  21<L>   |  0.53  			Ph 2.3<L>      Coags (06-21 @ 18:45)  aPTT 28.1 / INR 1.20<H> / PT 13.9<H>      ABG (06-22 @ 00:52)     7.39 / 35 / 110<H> / 21 / -3.2<L> / 99.3<H>%     Lactate:    ABG (06-21 @ 20:38)     7.30<L> / 42<H> / 81<L> / 21 / -5.5<L> / 97.1%     Lactate:        --------------------------------------------------------------------------------------    OTHER LABORATORY:     IMAGING STUDIES:   CXR:     ASSESSMENT:   42 year old [place]ian speaking female 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 Patient is POD#2 s/p mandibulectomy, reconstruction of the mandible with transosteal bone implant, Right neck dissection and a left lower leg free flap.    PLAN:   NEUROLOGY:  -A&Ox3  -q2h flap checks per primary team   -pain control: tylenol standing, dilaudid prn  -OOB    RESPIRATORY:  -on room air  - Maintain O2 saturation >92%  -OOB/ISS    CARDIOVASCULAR:  -MAP >65  -no active issues    GI / NUTRITION:  -patient self d/cd KO tube (6/21)  -KO tube placement per primary team  -diet: NPO  -protonix  -senna & miralax    RENAL / GENITOURINARY:  -D5/LR @100  - Monitor electrolytes and replete PRN  - Continue to monitor strict ins and outs q1 hour    HEMATOLOGIC:  -H&H stable  -SCDs  -DVT px: lovenox daily     INFECTIOUS DISEASE:  - Currently afebrile with no leukocytosis  - Continue to monitor off antibiotics, unasyn completed (6/22)    ENDOCRINOLOGY:  - No active issues  - Continue to monitor glucose on BMP (goal 140-180)  -ISS     Tubes/Lines/Drains  -PIVS  -Right neck LEVY drain  -Left thigh LEVY drain       Disposition: SICU    --------------------------------------------------------------------------------------    Critical Care Diagnoses: SICU Daily Progress Note  =====================================================  Interval/Overnight Events:    -q2h flap checks per primary team  -KO tube placed  - LAB HOLIDAY 6/24    POD #2          	SICU Day #   2    HPI:   42 year old [place] speaking female 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 Patient is POD#2 s/p mandibulectomy, reconstruction of the mandible with transosteal bone implant, Right neck dissection and a left lower leg free flap.        ALLERGIES:  No Known Allergies      --------------------------------------------------------------------------------------    MEDICATIONS:    Neurologic Medications  acetaminophen   IVPB .. 1000 milliGRAM(s) IV Intermittent every 6 hours  HYDROmorphone  Injectable 0.5 milliGRAM(s) IV Push every 4 hours PRN Severe Pain (7 - 10)  HYDROmorphone  Injectable 0.25 milliGRAM(s) IV Push every 4 hours PRN Moderate Pain (4 - 6)    Respiratory Medications    Cardiovascular Medications    Gastrointestinal Medications  dextrose 5% + lactated ringers. 1000 milliLiter(s) IV Continuous <Continuous>    Genitourinary Medications    Hematologic/Oncologic Medications  enoxaparin Injectable 40 milliGRAM(s) SubCutaneous every 24 hours    Antimicrobial/Immunologic Medications    Endocrine/Metabolic Medications  dextrose 50% Injectable 25 Gram(s) IV Push once  dextrose 50% Injectable 12.5 Gram(s) IV Push once  dextrose 50% Injectable 25 Gram(s) IV Push once  insulin lispro (ADMELOG) corrective regimen sliding scale   SubCutaneous every 6 hours    Topical/Other Medications  chlorhexidine 0.12% Liquid 15 milliLiter(s) Swish and Spit two times a day    --------------------------------------------------------------------------------------    VITAL SIGNS:    --------------------------------------------------------------------------------------    INS AND OUTS:  ICU Vital Signs Last 24 Hrs  T(C): 36.8 (23 Jun 2022 00:00), Max: 37.6 (22 Jun 2022 12:00)  T(F): 98.3 (23 Jun 2022 00:00), Max: 99.6 (22 Jun 2022 12:00)  HR: 64 (23 Jun 2022 02:00) (64 - 110)  BP: 127/80 (23 Jun 2022 01:00) (104/70 - 141/77)  BP(mean): 96 (23 Jun 2022 01:00) (80 - 99)  ABP: --  ABP(mean): --  RR: 12 (23 Jun 2022 02:00) (11 - 23)  SpO2: 100% (23 Jun 2022 02:00) (97% - 100%)    --------------------------------------------------------------------------------------    EXAM  NEUROLOGY  Exam: Normal, in no acute distress.  Alert and oriented x4.  No focal neurologic deficits.     RESPIRATORY  Exam:  Normal expansion/effort.    HEENT:  Exam: Neck incision with steri-strips c/d/i with staples, soft no hematoma. Flap with good doppler signal. Right sided J drain with sanguineous output.      CARDIOVASCULAR  Exam:  Regular rate and rhythm.     GI/NUTRITION  Exam: Abdomen soft, Non-tender, Non-distended.    Current Diet: NPO    VASCULAR  Exam: Extremities warm, pink, well-perfused. LEVY drain on left thigh with ace wrap, sanguineous output.       METABOLIC / FLUIDS / ELECTROLYTES  dextrose 5% + lactated ringers. 1000 milliLiter(s) IV Continuous <Continuous>      HEMATOLOGIC  [x] VTE Prophylaxis: enoxaparin Injectable 40 milliGRAM(s) SubCutaneous every 24 hours    Transfusions:	[] PRBC	[] Platelets		[] FFP	[] Cryoprecipitate    INFECTIOUS DISEASE  Antimicrobials/Immunologic Medications:    Day #      of     ***    TUBES / LINES / DRAINS  ***  [x] Peripheral IV  [] Central Venous Line     	[] R	[] L	[] IJ	[] Fem	[] SC	Date Placed:   [] Arterial Line		[] R	[] L	[] Fem	[] Rad	[] Ax	Date Placed:   [] PICC		[] Midline		[] Mediport  [] Urinary Catheter		Date Placed:   [x] Necessity of urinary, arterial, and venous catheters discussed    --------------------------------------------------------------------------------------    LABS  CBC (06-23 @ 00:49)                              10.6<L>                         16.61<H>  )----------------(  220        --    % Neutrophils, --    % Lymphocytes, ANC: --                                  32.2<L>  CBC (06-22 @ 01:16)                              11.1<L>                         14.70<H>  )----------------(  203        --    % Neutrophils, --    % Lymphocytes, ANC: --                                  33.3<L>    BMP (06-23 @ 00:49)             138     |  104     |  9     		Ca++ --      Ca 8.1<L>             ---------------------------------( 168<H>		Mg 2.30               4.1     |  25      |  0.51  			Ph 2.4<L>  BMP (06-22 @ 01:16)             134<L>  |  100     |  5<L>  		Ca++ --      Ca 8.0<L>             ---------------------------------( 194<H>		Mg 2.50               4.2     |  21<L>   |  0.53  			Ph 2.3<L>      Coags (06-21 @ 18:45)  aPTT 28.1 / INR 1.20<H> / PT 13.9<H>      ABG (06-22 @ 00:52)     7.39 / 35 / 110<H> / 21 / -3.2<L> / 99.3<H>%     Lactate:    ABG (06-21 @ 20:38)     7.30<L> / 42<H> / 81<L> / 21 / -5.5<L> / 97.1%     Lactate:        --------------------------------------------------------------------------------------    OTHER LABORATORY:     IMAGING STUDIES:   CXR:     ASSESSMENT:   42 year old [place] speaking female 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 Patient is POD#2 s/p mandibulectomy, reconstruction of the mandible with transosteal bone implant, Right neck dissection and a left lower leg free flap.    PLAN:   NEUROLOGY:  -A&Ox3  -q2h flap checks per primary team   -pain control: oxy and tylenol solution  -OOB    RESPIRATORY:  -on room air  - Maintain O2 saturation >92%  -OOB/ISS    CARDIOVASCULAR:  -MAP >65  -no active issues    GI / NUTRITION:  -KO tube replaced  -diet: TF  -senna & miralax    RENAL / GENITOURINARY:  - Fluids DCd  - Monitor electrolytes and replete PRN  - S/p graham voiding spontaneously    HEMATOLOGIC:  -H&H stable  -SCDs  -DVT px: lovenox daily     INFECTIOUS DISEASE:  - Currently afebrile with no leukocytosis  - Continue to monitor off antibiotics, unasyn completed (6/22)    ENDOCRINOLOGY:  - No active issues  - Continue to monitor glucose on BMP (goal 140-180)  -ISS     Tubes/Lines/Drains  -PIVS  -Right neck LEVY drain  -Left thigh LEVY drain     Disposition: SICU    --------------------------------------------------------------------------------------    Critical Care Diagnoses:

## 2022-06-23 NOTE — DIETITIAN INITIAL EVALUATION ADULT - PERTINENT LABORATORY DATA
06-23    138  |  104  |  9   ----------------------------<  168<H>  4.1   |  25  |  0.51    Ca    8.1<L>      23 Jun 2022 00:49  Phos  2.4     06-23  Mg     2.30     06-23    POCT Blood Glucose.: 98 mg/dL (06-23-22 @ 11:20)  A1C with Estimated Average Glucose Result: 5.2 % (06-22-22 @ 01:16)

## 2022-06-23 NOTE — PROGRESS NOTE ADULT - ASSESSMENT
Pt is a 42F s/p mandibulectomy with OMFS and reconstruction with free fibula flap on 6/21, since doing well    - flap checks for exam and Cook signal q2  - please replace NGT  - Peridex for oral hygiene  - continue drains, monitor output  - remainder care per SICU/primary    #[id number]

## 2022-06-23 NOTE — DIETITIAN INITIAL EVALUATION ADULT - ORAL INTAKE PTA/DIET HISTORY
Met w/pt who provided nutrition hx.  Pt denies food allergies, or wt loss PTA.  Pt reports some difficulty chewing PTA.  She states her appetite is fair at this time.  RN hanging enteral formula at time of visit.

## 2022-06-23 NOTE — DIETITIAN INITIAL EVALUATION ADULT - PERTINENT MEDS FT
MEDICATIONS  (STANDING):  chlorhexidine 0.12% Liquid 15 milliLiter(s) Swish and Spit two times a day  dextrose 50% Injectable 25 Gram(s) IV Push once  dextrose 50% Injectable 12.5 Gram(s) IV Push once  dextrose 50% Injectable 25 Gram(s) IV Push once  enoxaparin Injectable 40 milliGRAM(s) SubCutaneous every 24 hours  insulin lispro (ADMELOG) corrective regimen sliding scale   SubCutaneous every 6 hours    MEDICATIONS  (PRN):  acetaminophen    Suspension .. 975 milliGRAM(s) Oral every 6 hours PRN Temp greater or equal to 38C (100.4F), Moderate Pain (4 - 6)  oxyCODONE    Solution 5 milliGRAM(s) Oral every 6 hours PRN Moderate Pain (4 - 6)  oxyCODONE    Solution 10 milliGRAM(s) Oral every 6 hours PRN Severe Pain (7 - 10)

## 2022-06-23 NOTE — PROGRESS NOTE ADULT - ASSESSMENT
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   - ERAS protocol: Consult PT, ambulate 3-4x/day  - PRS - will attempt replacement versus IR consult for placement.   - Flap and donor site care per PRS.   - Appreciate SICU Care    OMFS [id number] lethargic/confused

## 2022-06-23 NOTE — DIETITIAN INITIAL EVALUATION ADULT - ENTERAL
Suggest change TF formula to Jevity 1.2 and advance to goal of 40mL/h to provide 1152kcal w/53gm protein to meet current needs.

## 2022-06-23 NOTE — DIETITIAN INITIAL EVALUATION ADULT - OTHER INFO
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 Patient is POD#2 s/p mandibulectomy, reconstruction of the mandible with transosteal bone implant, Right neck dissection and a left lower leg free flap.

## 2022-06-23 NOTE — PROGRESS NOTE ADULT - SUBJECTIVE AND OBJECTIVE BOX
SUBJECTIVE:  Patient self dc'd KO tube overnight.     OBJECTIVE:     ** VITAL SIGNS / I&O's **    T(C): 36.9 (06-23-22 @ 04:00), Max: 37.6 (06-22-22 @ 12:00)  T(F): 98.4 (06-23-22 @ 04:00), Max: 99.6 (06-22-22 @ 12:00)  HR: 77 (06-23-22 @ 09:00) (64 - 110)  BP: 107/91 (06-23-22 @ 09:00) (106/74 - 141/77)  ABP: --  ABP(mean): --  RR: 15 (06-23-22 @ 09:00) (12 - 23)  SpO2: 100% (06-23-22 @ 09:00) (97% - 100%)        22 Jun 2022 07:01  -  23 Jun 2022 07:00  --------------------------------------------------------  IN:    dextrose 5% + lactated ringers: 400 mL    dextrose 5% + lactated ringers: 1200 mL    IV PiggyBack: 550 mL  Total IN: 2150 mL    OUT:    Bulb (mL): 60 mL    Bulb (mL): 60 mL    Indwelling Catheter - Urethral (mL): 200 mL    Voided (mL): 700 mL  Total OUT: 1020 mL    Total NET: 1130 mL      23 Jun 2022 07:01  -  23 Jun 2022 09:34  --------------------------------------------------------  IN:    dextrose 5% + lactated ringers: 200 mL  Total IN: 200 mL    OUT:    Bulb (mL): 20 mL    Bulb (mL): 20 mL    Voided (mL): 150 mL  Total OUT: 190 mL    Total NET: 10 mL          ** PHYSICAL EXAM **    -- CONSTITUTIONAL: NAD  -- HEENT: flap soft and pink with 2-3 second capillary refill. (+) arterial Cook Doppler signal.   -- NECK: flat, LEVY serosang, incision c/d/i  -- RESPIRATORY: nonlabored respirations  -- EXTREMITIES: Lower leg: dressing in place c/d/i, drain with s/s output, soft, SILT    ** LABS **                  10.6   16.61  )----------(  220       ( 23 Jun 2022 00:49 )               32.2      138    |  104    |  9      ----------------------------<  168        ( 23 Jun 2022 00:49 )  4.1     |  25     |  0.51     Ca    8.1        ( 23 Jun 2022 00:49 )  Phos  2.4       ( 23 Jun 2022 00:49 )  Mg     2.30      ( 23 Jun 2022 00:49 )          CAPILLARY BLOOD GLUCOSE

## 2022-06-23 NOTE — PROGRESS NOTE ADULT - SUBJECTIVE AND OBJECTIVE BOX
SUBJECTIVE:  6/22: NGT removed yesterday. [place] removed. No issues overnight.  6/23: removed NGT again yesterday. naeon     Vital Signs Last 24 Hrs  T(C): 37.5 (22 Jun 2022 04:00), Max: 37.5 (22 Jun 2022 04:00)  T(F): 99.5 (22 Jun 2022 04:00), Max: 99.5 (22 Jun 2022 04:00)  HR: 70 (22 Jun 2022 07:00) (65 - 98)  BP: 112/76 (22 Jun 2022 07:00) (110/66 - 140/92)  BP(mean): 87 (22 Jun 2022 07:00) (79 - 110)  RR: 14 (22 Jun 2022 07:00) (10 - 17)  SpO2: 100% (22 Jun 2022 07:00) (96% - 100%)    PE:  General: NAD, awake, alert, oriented  Neck: Incision c/d/i. LEVY SS. Neck soft/flat. No erythema or focal swelling.  Ext: L FFF donor site Ace wrapped.     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     - Admitted to FS  - ERAS flap protocol  - Appreciate SICU care  - Rest of care per OMFS  - Flap and donor site care per PRS  - Recommend out of bed to chair, NGT replacement per OMFS/PRS

## 2022-06-24 LAB
ANION GAP SERPL CALC-SCNC: 12 MMOL/L — SIGNIFICANT CHANGE UP (ref 7–14)
BUN SERPL-MCNC: 8 MG/DL — SIGNIFICANT CHANGE UP (ref 7–23)
CALCIUM SERPL-MCNC: 8.8 MG/DL — SIGNIFICANT CHANGE UP (ref 8.4–10.5)
CHLORIDE SERPL-SCNC: 101 MMOL/L — SIGNIFICANT CHANGE UP (ref 98–107)
CO2 SERPL-SCNC: 24 MMOL/L — SIGNIFICANT CHANGE UP (ref 22–31)
CREAT SERPL-MCNC: 0.49 MG/DL — LOW (ref 0.5–1.3)
EGFR: 119 ML/MIN/1.73M2 — SIGNIFICANT CHANGE UP
GLUCOSE BLDC GLUCOMTR-MCNC: 130 MG/DL — HIGH (ref 70–99)
GLUCOSE BLDC GLUCOMTR-MCNC: 131 MG/DL — HIGH (ref 70–99)
GLUCOSE BLDC GLUCOMTR-MCNC: 132 MG/DL — HIGH (ref 70–99)
GLUCOSE SERPL-MCNC: 132 MG/DL — HIGH (ref 70–99)
HCT VFR BLD CALC: 35.8 % — SIGNIFICANT CHANGE UP (ref 34.5–45)
HGB BLD-MCNC: 11.9 G/DL — SIGNIFICANT CHANGE UP (ref 11.5–15.5)
MAGNESIUM SERPL-MCNC: 2 MG/DL — SIGNIFICANT CHANGE UP (ref 1.6–2.6)
MCHC RBC-ENTMCNC: 30 PG — SIGNIFICANT CHANGE UP (ref 27–34)
MCHC RBC-ENTMCNC: 33.2 GM/DL — SIGNIFICANT CHANGE UP (ref 32–36)
MCV RBC AUTO: 90.2 FL — SIGNIFICANT CHANGE UP (ref 80–100)
NRBC # BLD: 0 /100 WBCS — SIGNIFICANT CHANGE UP
NRBC # FLD: 0 K/UL — SIGNIFICANT CHANGE UP
PHOSPHATE SERPL-MCNC: 3.4 MG/DL — SIGNIFICANT CHANGE UP (ref 2.5–4.5)
PLATELET # BLD AUTO: 249 K/UL — SIGNIFICANT CHANGE UP (ref 150–400)
POTASSIUM SERPL-MCNC: 3.8 MMOL/L — SIGNIFICANT CHANGE UP (ref 3.5–5.3)
POTASSIUM SERPL-SCNC: 3.8 MMOL/L — SIGNIFICANT CHANGE UP (ref 3.5–5.3)
RBC # BLD: 3.97 M/UL — SIGNIFICANT CHANGE UP (ref 3.8–5.2)
RBC # FLD: 12.5 % — SIGNIFICANT CHANGE UP (ref 10.3–14.5)
SODIUM SERPL-SCNC: 137 MMOL/L — SIGNIFICANT CHANGE UP (ref 135–145)
WBC # BLD: 10.94 K/UL — HIGH (ref 3.8–10.5)
WBC # FLD AUTO: 10.94 K/UL — HIGH (ref 3.8–10.5)

## 2022-06-24 PROCEDURE — 93010 ELECTROCARDIOGRAM REPORT: CPT

## 2022-06-24 PROCEDURE — 99232 SBSQ HOSP IP/OBS MODERATE 35: CPT

## 2022-06-24 RX ADMIN — CHLORHEXIDINE GLUCONATE 15 MILLILITER(S): 213 SOLUTION TOPICAL at 05:31

## 2022-06-24 RX ADMIN — ENOXAPARIN SODIUM 40 MILLIGRAM(S): 100 INJECTION SUBCUTANEOUS at 13:17

## 2022-06-24 RX ADMIN — CHLORHEXIDINE GLUCONATE 15 MILLILITER(S): 213 SOLUTION TOPICAL at 18:05

## 2022-06-24 NOTE — PROGRESS NOTE ADULT - ATTENDING COMMENTS
flap viable  no apparent pulmonic complications from inadvertant enteral tube placement in left bronchus, will replace with guidance  mobilize
I agree with the detailed interval history, physical, and plan, which I have reviewed and edited where appropriate'; also agree with notes/assessment with my team on service.  I have personally examined the patient.  I was physically present for the key portions of the evaluation and management (E/M) service provided.  I reviewed all the pertinent data.  The patient is a critical care patient with life threatening hemodynamic and metabolic instability in SICU.  The SICU team has a constant risk benefit analyzes discussion and coordinating care with the primary team and all consultants.   The patient is in SICU with the chief complaint and diagnosis mentioned in the note.   The plan will be specified in the note.  42 year old sp mandibulectomy, partial incision  procedures on the tracheal bronchi, reconstruction of the mandible with transosteal bone plate , iliac crest scapula removal of implant deep; pt reports hx of small mass removed by oral surgeon, reports after a few months patient developed a cyst that expanded over the whole mandible, s/p multiple teeth extractions, reports temporary mandible implant that is causing her pain and discomfort. Patient is POD#2 s/p mandibulectomy, reconstruction of the mandible with transosteal bone implant, Right neck dissection and a left lower leg free flap.  NEUROLOGY  Exam: no focal deficits.  RESPIRATORY  Exam: clear  CARDIOVASCULAR  Exam: Regular rate  GI/NUTRITION  Exam: Abdomen soft, Non-tender,  VASCULAR  Extremities warm,  PLAN:   NEUROLOGY:  Pain control  -Tylenol 975mg PO q6hr and Oxycodone   - q4 flaps  RESPIRATORY: respiratory abnormality/Trach  - saturation >92%  CARDIOVASCULAR:  - Monitor hemodynamics  GI / NUTRITION:  - Jevity 1.2 @ 40cc/hr  RENAL / GENITOURINARY:  - Monitor electrolytes   HEMATOLOGIC:  -Lovenox 40mg SQ daily  INFECTIOUS DISEASE:  - Trend fever curve   ENDOCRINOLOGY:  -  monitor glucose   Disposition: SICU
flap viable  NGT replaced with guidance but patient removed it herself this morning; will re-insert per PRS recommendation  d/c IVF when tolerating tube feeds  mobilize

## 2022-06-24 NOTE — PROGRESS NOTE ADULT - SUBJECTIVE AND OBJECTIVE BOX
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 Patient is POD#2 s/p mandibulectomy, reconstruction of the mandible with transosteal bone implant, Right neck dissection and a left lower leg free flap.    Allergies:   No Known Allergies    MEDICATIONS:   --------------------------------------------------------------------------------------  Neurologic Medications  acetaminophen    Suspension .. 975 milliGRAM(s) Oral every 6 hours PRN Temp greater or equal to 38C (100.4F), Moderate Pain (4 - 6)  oxyCODONE    Solution 5 milliGRAM(s) Oral every 6 hours PRN Moderate Pain (4 - 6)  oxyCODONE    Solution 10 milliGRAM(s) Oral every 6 hours PRN Severe Pain (7 - 10)    Respiratory Medications    Cardiovascular Medications    Gastrointestinal Medications    Genitourinary Medications    Hematologic/Oncologic Medications  enoxaparin Injectable 40 milliGRAM(s) SubCutaneous every 24 hours    Antimicrobial/Immunologic Medications    Endocrine/Metabolic Medications  insulin lispro (ADMELOG) corrective regimen sliding scale   SubCutaneous every 6 hours    Topical/Other Medications  chlorhexidine 0.12% Liquid 15 milliLiter(s) Swish and Spit two times a day    --------------------------------------------------------------------------------------  VITAL SIGNS, INS/OUTS (last 24 hours):  --------------------------------------------------------------------------------------  T(C): 37.3 (06-24-22 @ 00:00), Max: 37.4 (06-23-22 @ 20:00)  HR: 89 (06-24-22 @ 00:00) (64 - 109)  BP: 121/86 (06-24-22 @ 00:00) (99/81 - 140/95)  RR: 15 (06-24-22 @ 00:00) (12 - 20)  SpO2: 98% (06-24-22 @ 00:00) (98% - 100%)    06-22-22 @ 07:01  -  06-23-22 @ 07:00  --------------------------------------------------------  IN: 2150 mL / OUT: 1020 mL / NET: 1130 mL    06-23-22 @ 07:01  -  06-24-22 @ 00:50  --------------------------------------------------------  IN: 765 mL / OUT: 1125 mL / NET: -360 mL      --------------------------------------------------------------------------------------    EXAM  NEUROLOGY  Exam: Normal, NAD, alert, oriented x3, no focal deficits.    HEENT  Exam: Normocephalic, atraumatic, EOMI.     RESPIRATORY  Exam: Normal expansion/effort.  Mechanical Ventilation:     CARDIOVASCULAR  Exam: Regular rate and rhythm.       GI/NUTRITION  Exam: Abdomen soft, Non-tender, Non-distended.     VASCULAR  Exam: Extremities warm, pink, well-perfused.     MUSCULOSKELETAL  Exam: All extremities moving spontaneously without limitations.     SKIN  Exam: Good skin turgor, no skin breakdown.       LABS  --------------------------------------------------------------------------------------                        10.6   16.61 )-----------( 220      ( 23 Jun 2022 00:49 )             32.2   06-23    138  |  104  |  9   ----------------------------<  168<H>  4.1   |  25  |  0.51    Ca    8.1<L>      23 Jun 2022 00:49  Phos  2.4     06-23  Mg     2.30     06-23    ABG - ( 22 Jun 2022 00:52 )  pH, Arterial: 7.39  pH, Blood: x     /  pCO2: 35    /  pO2: 110   / HCO3: 21    / Base Excess: -3.2  /  SaO2: 99.3              --------------------------------------------------------------------------------------     SICU Daily Progress Note  =====================================================  Interval/Overnight Events:     - advanced to q4 flap checks, listed for floor  - LAB HOLIDAY 6/24  - No acute events overnight    HPI: 42 year old Chinese speaking female 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 Patient is POD#2 s/p mandibulectomy, reconstruction of the mandible with transosteal bone implant, Right neck dissection and a left lower leg free flap.    Allergies:   No Known Allergies    MEDICATIONS:   --------------------------------------------------------------------------------------  Neurologic Medications  acetaminophen    Suspension .. 975 milliGRAM(s) Oral every 6 hours PRN Temp greater or equal to 38C (100.4F), Moderate Pain (4 - 6)  oxyCODONE    Solution 5 milliGRAM(s) Oral every 6 hours PRN Moderate Pain (4 - 6)  oxyCODONE    Solution 10 milliGRAM(s) Oral every 6 hours PRN Severe Pain (7 - 10)    Respiratory Medications    Cardiovascular Medications    Gastrointestinal Medications    Genitourinary Medications    Hematologic/Oncologic Medications  enoxaparin Injectable 40 milliGRAM(s) SubCutaneous every 24 hours    Antimicrobial/Immunologic Medications    Endocrine/Metabolic Medications  insulin lispro (ADMELOG) corrective regimen sliding scale   SubCutaneous every 6 hours    Topical/Other Medications  chlorhexidine 0.12% Liquid 15 milliLiter(s) Swish and Spit two times a day    --------------------------------------------------------------------------------------  VITAL SIGNS, INS/OUTS (last 24 hours):  --------------------------------------------------------------------------------------  T(C): 37.3 (06-24-22 @ 00:00), Max: 37.4 (06-23-22 @ 20:00)  HR: 89 (06-24-22 @ 00:00) (64 - 109)  BP: 121/86 (06-24-22 @ 00:00) (99/81 - 140/95)  RR: 15 (06-24-22 @ 00:00) (12 - 20)  SpO2: 98% (06-24-22 @ 00:00) (98% - 100%)    06-22-22 @ 07:01  -  06-23-22 @ 07:00  --------------------------------------------------------  IN: 2150 mL / OUT: 1020 mL / NET: 1130 mL    06-23-22 @ 07:01  -  06-24-22 @ 00:50  --------------------------------------------------------  IN: 765 mL / OUT: 1125 mL / NET: -360 mL      --------------------------------------------------------------------------------------    EXAM  NEUROLOGY  Exam: Normal, NAD, alert, oriented x3, no focal deficits.    HEENT  Exam: Normocephalic, atraumatic, EOMI.     RESPIRATORY  Exam: Normal expansion/effort.  Mechanical Ventilation:     CARDIOVASCULAR  Exam: Regular rate and rhythm.       GI/NUTRITION  Exam: Abdomen soft, Non-tender, Non-distended.     VASCULAR  Exam: Extremities warm, pink, well-perfused.     MUSCULOSKELETAL  Exam: All extremities moving spontaneously without limitations.     SKIN  Exam: Good skin turgor, no skin breakdown.       LABS  --------------------------------------------------------------------------------------                        10.6   16.61 )-----------( 220      ( 23 Jun 2022 00:49 )             32.2   06-23    138  |  104  |  9   ----------------------------<  168<H>  4.1   |  25  |  0.51    Ca    8.1<L>      23 Jun 2022 00:49  Phos  2.4     06-23  Mg     2.30     06-23    ABG - ( 22 Jun 2022 00:52 )  pH, Arterial: 7.39  pH, Blood: x     /  pCO2: 35    /  pO2: 110   / HCO3: 21    / Base Excess: -3.2  /  SaO2: 99.3              --------------------------------------------------------------------------------------

## 2022-06-24 NOTE — PROGRESS NOTE ADULT - ASSESSMENT
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 Patient is POD#2 s/p mandibulectomy, reconstruction of the mandible with transosteal bone implant, Right neck dissection and a left lower leg free flap.    PLAN:   NEUROLOGY:  - Pain control: Tylenol 975mg PO q6hr and Oxycodone 5/10mg q6hr PRN  - q2h flap checks per primary team    RESPIRATORY:  - Maintain O2 saturation >92%  - OOB/IS    CARDIOVASCULAR:  - Monitor hemodynamics    GI / NUTRITION:  - NPO w/ TF Jevity 1.2 @ 40cc/hr    RENAL / GENITOURINARY:  - Monitor I&Os  - Monitor electrolytes and replete PRN    HEMATOLOGIC:  - VTE ppx: Lovenox 40mg SQ daily  - Trend H/H on CBC daily    INFECTIOUS DISEASE:  - Trend fever curve and WBC on CBC daily    ENDOCRINOLOGY:  - mISS q6hr  - No active issues  - Continue to monitor glucose on BMP (goal 140-180)    Tubes/Lines/Drains  -PIVs  -Right neck LEVY drain  -Left thigh LEVY drain     Disposition: SICU 42 year old French speaking female 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 Patient is POD#2 s/p mandibulectomy, reconstruction of the mandible with transosteal bone implant, Right neck dissection and a left lower leg free flap.    PLAN:   NEUROLOGY:  - Pain control: Tylenol 975mg PO q6hr and Oxycodone 5/10mg q6hr PRN  - q2h flap checks per primary team    RESPIRATORY:  - Maintain O2 saturation >92%  - OOB/IS    CARDIOVASCULAR:  - Monitor hemodynamics    GI / NUTRITION:  - NPO w/ TF Jevity 1.2 @ 40cc/hr    RENAL / GENITOURINARY:  - Monitor I&Os  - Monitor electrolytes and replete PRN    HEMATOLOGIC:  - VTE ppx: Lovenox 40mg SQ daily  - Trend H/H on CBC daily    INFECTIOUS DISEASE:  - Trend fever curve and WBC on CBC daily    ENDOCRINOLOGY:  - mISS q6hr  - No active issues  - Continue to monitor glucose on BMP (goal 140-180)    Tubes/Lines/Drains  -PIVs  -Right neck LEVY drain  -Left thigh LEVY drain     Disposition: SICU 42 year old Estonian speaking female 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 Patient is POD#2 s/p mandibulectomy, reconstruction of the mandible with transosteal bone implant, Right neck dissection and a left lower leg free flap.    PLAN:   NEUROLOGY:  - Pain control: Tylenol 975mg PO q6hr and Oxycodone 5/10mg q6hr PRN  - q4 flaps    RESPIRATORY:  - Maintain O2 saturation >92%  - OOB/IS    CARDIOVASCULAR:  - Monitor hemodynamics    GI / NUTRITION:  - NPO w/ TF Jevity 1.2 @ 40cc/hr    RENAL / GENITOURINARY:  - Monitor I&Os  - Monitor electrolytes and replete PRN    HEMATOLOGIC:  - VTE ppx: Lovenox 40mg SQ daily  - Trend H/H on CBC daily    INFECTIOUS DISEASE:  - Trend fever curve and WBC on CBC daily    ENDOCRINOLOGY:  - mISS q6hr  - No active issues  - Continue to monitor glucose on BMP (goal 140-180)    Tubes/Lines/Drains  -PIVs  -Right neck LEVY drain  -Left thigh LEVY drain     Disposition: SICU

## 2022-06-24 NOTE — PROGRESS NOTE ADULT - ASSESSMENT
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   - ERAS protocol: Downsize trach  - Ambulate 3-4x/day  - Flap and donor site care per PRS.   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   - ERAS protocol  - Ambulate 3-4x/day  - Flap and donor site care per PRS.   - Appreciate SICU Care.     [facility] [id number]

## 2022-06-24 NOTE — PROGRESS NOTE ADULT - ASSESSMENT
Pt is a 42F s/p mandibulectomy with OMFS and reconstruction with free fibula flap on 6/21, since doing well    - flap checks for exam q4, scratch BID  - OK for floor  - Peridex for oral hygiene  - continue drains, monitor output  - remainder care per SICU/primary    #[id number]

## 2022-06-24 NOTE — PROGRESS NOTE ADULT - SUBJECTIVE AND OBJECTIVE BOX
ORAL MAXILLOFACIAL SURGERY  Pager: [phone]    STATUS POST:  Mandibulectomy, reconstruction of the mandible with transosteal bone implant, Right neck dissection and a left lower leg free flap.   POST OPERATIVE DAY #3      INTERVAL EVENTS/SUBJECTIVE:  No acute events overnight. Pain is well-controlled. NG tube in and TF on.     ______________________________________________  OBJECTIVE:   T(C): 37.3 (24 Jun 2022 04:00), Max: 37.4 (23 Jun 2022 20:00)  T(F): 99.1 (24 Jun 2022 04:00), Max: 99.4 (23 Jun 2022 20:00)  HR: 74 (24 Jun 2022 06:00) (66 - 109)  BP: 128/86 (24 Jun 2022 06:00) (99/81 - 140/95)  BP(mean): 96 (24 Jun 2022 06:00) (49 - 108)  RR: 13 (24 Jun 2022 06:00) (12 - 20)  SpO2: 97% (24 Jun 2022 06:00) (97% - 100%)    CAPILLARY BLOOD GLUCOSE    POCT Blood Glucose.: 161 mg/dL (23 Jun 2022 05:17)  POCT Blood Glucose.: 166 mg/dL (23 Jun 2022 00:51)  I&O's Detail    23 Jun 2022 07:01  -  24 Jun 2022 07:00  --------------------------------------------------------  IN:    dextrose 5% + lactated ringers: 400 mL    Glucerna 1.5: 20 mL    Jevity 1.2: 625 mL  Total IN: 1045 mL    OUT:    Bulb (mL): 45 mL    Bulb (mL): 40 mL    Voided (mL): 1150 mL  Total OUT: 1235 mL    Total NET: -190 mL      POCT Blood Glucose.: 142 mg/dL (22 Jun 2022 18:32)  POCT Blood Glucose.: 180 mg/dL (22 Jun 2022 11:09)        Physical exam:  Gen: AAOx3, NAD, Communicative  Nose: NGT sutured at left  Neck: Incision c/d/i. LEVY SS. Neck soft/flat. No erythema or focal swelling. Intra-oral flap warm with strong doppler signal. Hemostatic.   Ext: L FFF donor site Ace wrapped.   ______________________________________________                          10.6   16.61 )-----------( 220      ( 23 Jun 2022 00:49 )             32.2       06-23    138  |  104  |  9   ----------------------------<  168<H>  4.1   |  25  |  0.51    Ca    8.1<L>      23 Jun 2022 00:49  Phos  2.4     06-23  Mg     2.30     06-23                              CAPILLARY BLOOD GLUCOSE      POCT Blood Glucose.: 131 mg/dL (24 Jun 2022 05:30)

## 2022-06-24 NOTE — PROGRESS NOTE ADULT - SUBJECTIVE AND OBJECTIVE BOX
SUBJECTIVE:  6/22: NGT removed yesterday. [place] removed. No issues overnight.  6/23: removed NGT again yesterday. ginger   6/24: No issues overnight. Tolerating NGT feeds.    Vital Signs Last 24 Hrs  T(C): 36.9 (24 Jun 2022 14:51), Max: 37.4 (23 Jun 2022 20:00)  T(F): 98.5 (24 Jun 2022 14:51), Max: 99.4 (23 Jun 2022 20:00)  HR: 118 (24 Jun 2022 14:51) (69 - 118)  BP: 141/88 (24 Jun 2022 14:51) (99/81 - 141/88)  BP(mean): 94 (24 Jun 2022 12:00) (86 - 108)  RR: 16 (24 Jun 2022 14:51) (12 - 20)  SpO2: 99% (24 Jun 2022 14:51) (97% - 100%)    PE:  General: NAD, awake, alert, oriented  Neck: Incision c/d/i. LEVY SS. Neck soft/flat. No erythema or focal swelling. NGT in plac.  Ext: L FFF donor site Ace wrapped.     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     - Admitted to FS  - ERAS flap protocol  - Appreciate SICU care  - Rest of care per OMFS  - Flap and donor site care per PRS  - Recommend bolus tube feeds and advancement of diet per OMFS

## 2022-06-25 LAB
ANION GAP SERPL CALC-SCNC: 13 MMOL/L — SIGNIFICANT CHANGE UP (ref 7–14)
BUN SERPL-MCNC: 12 MG/DL — SIGNIFICANT CHANGE UP (ref 7–23)
CALCIUM SERPL-MCNC: 9.4 MG/DL — SIGNIFICANT CHANGE UP (ref 8.4–10.5)
CHLORIDE SERPL-SCNC: 101 MMOL/L — SIGNIFICANT CHANGE UP (ref 98–107)
CO2 SERPL-SCNC: 25 MMOL/L — SIGNIFICANT CHANGE UP (ref 22–31)
CREAT SERPL-MCNC: 0.52 MG/DL — SIGNIFICANT CHANGE UP (ref 0.5–1.3)
EGFR: 117 ML/MIN/1.73M2 — SIGNIFICANT CHANGE UP
GLUCOSE BLDC GLUCOMTR-MCNC: 102 MG/DL — HIGH (ref 70–99)
GLUCOSE BLDC GLUCOMTR-MCNC: 113 MG/DL — HIGH (ref 70–99)
GLUCOSE BLDC GLUCOMTR-MCNC: 114 MG/DL — HIGH (ref 70–99)
GLUCOSE BLDC GLUCOMTR-MCNC: 114 MG/DL — HIGH (ref 70–99)
GLUCOSE BLDC GLUCOMTR-MCNC: 136 MG/DL — HIGH (ref 70–99)
GLUCOSE SERPL-MCNC: 111 MG/DL — HIGH (ref 70–99)
HCT VFR BLD CALC: 36.7 % — SIGNIFICANT CHANGE UP (ref 34.5–45)
HGB BLD-MCNC: 12.2 G/DL — SIGNIFICANT CHANGE UP (ref 11.5–15.5)
MAGNESIUM SERPL-MCNC: 2.1 MG/DL — SIGNIFICANT CHANGE UP (ref 1.6–2.6)
MCHC RBC-ENTMCNC: 30.2 PG — SIGNIFICANT CHANGE UP (ref 27–34)
MCHC RBC-ENTMCNC: 33.2 GM/DL — SIGNIFICANT CHANGE UP (ref 32–36)
MCV RBC AUTO: 90.8 FL — SIGNIFICANT CHANGE UP (ref 80–100)
NRBC # BLD: 0 /100 WBCS — SIGNIFICANT CHANGE UP
NRBC # FLD: 0 K/UL — SIGNIFICANT CHANGE UP
PHOSPHATE SERPL-MCNC: 4.5 MG/DL — SIGNIFICANT CHANGE UP (ref 2.5–4.5)
PLATELET # BLD AUTO: 258 K/UL — SIGNIFICANT CHANGE UP (ref 150–400)
POTASSIUM SERPL-MCNC: 3.9 MMOL/L — SIGNIFICANT CHANGE UP (ref 3.5–5.3)
POTASSIUM SERPL-SCNC: 3.9 MMOL/L — SIGNIFICANT CHANGE UP (ref 3.5–5.3)
RBC # BLD: 4.04 M/UL — SIGNIFICANT CHANGE UP (ref 3.8–5.2)
RBC # FLD: 12.5 % — SIGNIFICANT CHANGE UP (ref 10.3–14.5)
SODIUM SERPL-SCNC: 139 MMOL/L — SIGNIFICANT CHANGE UP (ref 135–145)
WBC # BLD: 10.18 K/UL — SIGNIFICANT CHANGE UP (ref 3.8–10.5)
WBC # FLD AUTO: 10.18 K/UL — SIGNIFICANT CHANGE UP (ref 3.8–10.5)

## 2022-06-25 PROCEDURE — 93010 ELECTROCARDIOGRAM REPORT: CPT

## 2022-06-25 RX ORDER — SODIUM CHLORIDE 9 MG/ML
1000 INJECTION, SOLUTION INTRAVENOUS ONCE
Refills: 0 | Status: COMPLETED | OUTPATIENT
Start: 2022-06-25 | End: 2022-06-25

## 2022-06-25 RX ADMIN — CHLORHEXIDINE GLUCONATE 15 MILLILITER(S): 213 SOLUTION TOPICAL at 18:35

## 2022-06-25 RX ADMIN — SODIUM CHLORIDE 1000 MILLILITER(S): 9 INJECTION, SOLUTION INTRAVENOUS at 19:33

## 2022-06-25 RX ADMIN — CHLORHEXIDINE GLUCONATE 15 MILLILITER(S): 213 SOLUTION TOPICAL at 05:28

## 2022-06-25 RX ADMIN — ENOXAPARIN SODIUM 40 MILLIGRAM(S): 100 INJECTION SUBCUTANEOUS at 12:53

## 2022-06-25 NOTE — PROGRESS NOTE ADULT - ASSESSMENT
Pt is a 42F s/p mandibulectomy with OMFS and reconstruction with free fibula flap on 6/21, since doing well    - flap checks q4 with cook  - Peridex for oral hygiene  - continue drains, monitor output  - remainder care per SICU/primary    #[id number]

## 2022-06-25 NOTE — PROGRESS NOTE ADULT - SUBJECTIVE AND OBJECTIVE BOX
Plastic Surgery Progress Note (pg [facility]: [id number], NS: [phone])    SUBJECTIVE  The patient was seen and examined. No acute events overnight.    OBJECTIVE  ___________________________________________________  VITAL SIGNS / I&O's   Vital Signs Last 24 Hrs  T(C): 36.7 (25 Jun 2022 06:00), Max: 36.9 (24 Jun 2022 14:51)  T(F): 98 (25 Jun 2022 06:00), Max: 98.5 (24 Jun 2022 14:51)  HR: 90 (25 Jun 2022 06:00) (86 - 118)  BP: 120/78 (25 Jun 2022 06:00) (113/85 - 141/88)  BP(mean): 94 (24 Jun 2022 12:00) (94 - 94)  RR: 18 (25 Jun 2022 06:00) (16 - 18)  SpO2: 99% (25 Jun 2022 06:00) (96% - 99%)      24 Jun 2022 07:01  -  25 Jun 2022 07:00  --------------------------------------------------------  IN:    Jevity 1.2: 760 mL  Total IN: 760 mL    OUT:    Bulb (mL): 30 mL    Bulb (mL): 35 mL    Voided (mL): 1525 mL  Total OUT: 1590 mL    Total NET: -830 mL        ___________________________________________________  PHYSICAL EXAM    -- CONSTITUTIONAL: NAD  -- HEENT: flap soft and pink with 2-3 second capillary refill. + Cook doppler  -- NECK: flat, LEVY serosang, incision c/d/i  -- RESPIRATORY: nonlabored respirations  -- EXTREMITIES: Lower leg: dressing in place c/d/i, drain with s/s output, soft, SILT  ___________________________________________________    ___________________________________________________  LABS                        12.2   10.18 )-----------( 258      ( 25 Jun 2022 05:20 )             36.7     25 Jun 2022 05:20    139    |  101    |  12     ----------------------------<  111    3.9     |  25     |  0.52     Ca    9.4        25 Jun 2022 05:20  Phos  4.5       25 Jun 2022 05:20  Mg     2.10      25 Jun 2022 05:20        CAPILLARY BLOOD GLUCOSE      POCT Blood Glucose.: 114 mg/dL (25 Jun 2022 05:18)  POCT Blood Glucose.: 136 mg/dL (25 Jun 2022 00:15)  POCT Blood Glucose.: 130 mg/dL (24 Jun 2022 17:59)  POCT Blood Glucose.: 132 mg/dL (24 Jun 2022 11:30)          ___________________________________________________  MICRO  Recent Cultures:    ___________________________________________________  MEDICATIONS  (STANDING):  chlorhexidine 0.12% Liquid 15 milliLiter(s) Swish and Spit two times a day  enoxaparin Injectable 40 milliGRAM(s) SubCutaneous every 24 hours  insulin lispro (ADMELOG) corrective regimen sliding scale   SubCutaneous every 6 hours    MEDICATIONS  (PRN):  acetaminophen    Suspension .. 975 milliGRAM(s) Oral every 6 hours PRN Temp greater or equal to 38C (100.4F), Moderate Pain (4 - 6)  oxyCODONE    Solution 5 milliGRAM(s) Oral every 6 hours PRN Moderate Pain (4 - 6)  oxyCODONE    Solution 10 milliGRAM(s) Oral every 6 hours PRN Severe Pain (7 - 10)

## 2022-06-25 NOTE — PROGRESS NOTE ADULT - SUBJECTIVE AND OBJECTIVE BOX
SUBJECTIVE:  6/22: NGT removed yesterday. [place] removed. No issues overnight.  6/23: removed NGT again yesterday. alonon   6/24: No issues overnight. Tolerating NGT feeds.  6/25: No events overnight, slightly tachycardic. Feels well this AM.     Vital Signs Last 24 Hrs  T(C): 36.9 (24 Jun 2022 14:51), Max: 37.4 (23 Jun 2022 20:00)  T(F): 98.5 (24 Jun 2022 14:51), Max: 99.4 (23 Jun 2022 20:00)  HR: 118 (24 Jun 2022 14:51) (69 - 118)  BP: 141/88 (24 Jun 2022 14:51) (99/81 - 141/88)  BP(mean): 94 (24 Jun 2022 12:00) (86 - 108)  RR: 16 (24 Jun 2022 14:51) (12 - 20)  SpO2: 99% (24 Jun 2022 14:51) (97% - 100%)    PE:  General: NAD, awake, alert, oriented  Neck: Incision c/d/i. LEVY SS. Neck soft/flat. No erythema or focal swelling. NGT in plac.  Ext: L FFF donor site Ace wrapped.     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     - Admitted to OMFS  - consider starting clears per ERAS protocol  - Rest of care per OMFS  - Flap and donor site care per PRS  - Recommend bolus tube feeds and advancement of diet per OMFS

## 2022-06-25 NOTE — PROGRESS NOTE ADULT - ASSESSMENT
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   - ERAS protocol  - Tolerating bolus NG tube feeds and clear liquid diet. Will advance to NGT + FLD.   - Ambulate as tolerated.   - PT eval: Anticipate discharge to rehabilitation facility   - Ambulate 3-4x/day  - Flap and donor site care per PRS.   - Will initiate dispo planning with [facility] [id number]

## 2022-06-25 NOTE — PROGRESS NOTE ADULT - SUBJECTIVE AND OBJECTIVE BOX
ORAL MAXILLOFACIAL SURGERY PROGRESS NOTE  Pager: [phone]    STATUS POST:  Mandibulectomy, reconstruction of the mandible with transosteal bone implant, Right neck dissection and a left lower leg free flap.     POST OPERATIVE DAY #4    SUBJECTIVE: No acute events overnight. Step down from SICU yesterday. Patient seen at admitting floor bed. Tolerating bolus NGT feeds and tolerating PO clear liquids. Pain is well-controlled. Ambulating.       INTERVAL EVENTS  6/22: NGT removed yesterday. [place] removed. No issues overnight.  6/23: removed NGT again yesterday. naeon   6/24: No issues overnight. Tolerating NGT feeds.  6/25: NAEON, Step down from SICU to surgical floor. Tolerating bolus NGT feeds and tolerating PO clear liquids. Pain is well-controlled. Ambulating.       ______________________________________________  OBJECTIVE:     ICU Vital Signs Last 24 Hrs  T(C): 36.7 (25 Jun 2022 06:00), Max: 36.9 (24 Jun 2022 14:51)  T(F): 98 (25 Jun 2022 06:00), Max: 98.5 (24 Jun 2022 14:51)  HR: 90 (25 Jun 2022 06:00) (86 - 118)  BP: 120/78 (25 Jun 2022 06:00) (113/85 - 141/88)  BP(mean): 94 (24 Jun 2022 12:00) (94 - 94)  RR: 18 (25 Jun 2022 06:00) (16 - 18)  SpO2: 99% (25 Jun 2022 06:00) (96% - 99%)    Physical exam:  Gen: AAOx3, NAD, Communicative  Nose: NGT sutured at left  Neck: Incision c/d/i. LEVY Right neck with SS output. Neck soft/flat. No erythema or focal swelling. Intra-oral flap warm with strong doppler signal. Hemostatic.   Ext: L FFF donor site Ace wrapped. LEVY drain with SS output.                           12.2   10.18 )-----------( 258      ( 25 Jun 2022 05:20 )             36.7       06-25    139  |  101  |  12  ----------------------------<  111<H>  3.9   |  25  |  0.52    Ca    9.4      25 Jun 2022 05:20  Phos  4.5     06-25  Mg     2.10     06-25      CAPILLARY BLOOD GLUCOSE      POCT Blood Glucose.: 114 mg/dL (25 Jun 2022 05:18)      I&O's Detail    24 Jun 2022 07:01  -  25 Jun 2022 07:00  --------------------------------------------------------  IN:    Jevity 1.2: 760 mL  Total IN: 760 mL    OUT:    Bulb (mL): 30 mL    Bulb (mL): 35 mL    Voided (mL): 1525 mL  Total OUT: 1590 mL    Total NET: -830 mL

## 2022-06-26 LAB
ANION GAP SERPL CALC-SCNC: 13 MMOL/L — SIGNIFICANT CHANGE UP (ref 7–14)
BUN SERPL-MCNC: 13 MG/DL — SIGNIFICANT CHANGE UP (ref 7–23)
CALCIUM SERPL-MCNC: 9.2 MG/DL — SIGNIFICANT CHANGE UP (ref 8.4–10.5)
CHLORIDE SERPL-SCNC: 101 MMOL/L — SIGNIFICANT CHANGE UP (ref 98–107)
CO2 SERPL-SCNC: 26 MMOL/L — SIGNIFICANT CHANGE UP (ref 22–31)
CREAT SERPL-MCNC: 0.56 MG/DL — SIGNIFICANT CHANGE UP (ref 0.5–1.3)
EGFR: 115 ML/MIN/1.73M2 — SIGNIFICANT CHANGE UP
GLUCOSE BLDC GLUCOMTR-MCNC: 127 MG/DL — HIGH (ref 70–99)
GLUCOSE BLDC GLUCOMTR-MCNC: 132 MG/DL — HIGH (ref 70–99)
GLUCOSE BLDC GLUCOMTR-MCNC: 133 MG/DL — HIGH (ref 70–99)
GLUCOSE BLDC GLUCOMTR-MCNC: 134 MG/DL — HIGH (ref 70–99)
GLUCOSE BLDC GLUCOMTR-MCNC: 146 MG/DL — HIGH (ref 70–99)
GLUCOSE SERPL-MCNC: 140 MG/DL — HIGH (ref 70–99)
HCT VFR BLD CALC: 36.2 % — SIGNIFICANT CHANGE UP (ref 34.5–45)
HGB BLD-MCNC: 12.1 G/DL — SIGNIFICANT CHANGE UP (ref 11.5–15.5)
MAGNESIUM SERPL-MCNC: 2.1 MG/DL — SIGNIFICANT CHANGE UP (ref 1.6–2.6)
MCHC RBC-ENTMCNC: 30.5 PG — SIGNIFICANT CHANGE UP (ref 27–34)
MCHC RBC-ENTMCNC: 33.4 GM/DL — SIGNIFICANT CHANGE UP (ref 32–36)
MCV RBC AUTO: 91.2 FL — SIGNIFICANT CHANGE UP (ref 80–100)
NRBC # BLD: 0 /100 WBCS — SIGNIFICANT CHANGE UP
NRBC # FLD: 0 K/UL — SIGNIFICANT CHANGE UP
PHOSPHATE SERPL-MCNC: 3.7 MG/DL — SIGNIFICANT CHANGE UP (ref 2.5–4.5)
PLATELET # BLD AUTO: 292 K/UL — SIGNIFICANT CHANGE UP (ref 150–400)
POTASSIUM SERPL-MCNC: 3.9 MMOL/L — SIGNIFICANT CHANGE UP (ref 3.5–5.3)
POTASSIUM SERPL-SCNC: 3.9 MMOL/L — SIGNIFICANT CHANGE UP (ref 3.5–5.3)
RBC # BLD: 3.97 M/UL — SIGNIFICANT CHANGE UP (ref 3.8–5.2)
RBC # FLD: 12.4 % — SIGNIFICANT CHANGE UP (ref 10.3–14.5)
SODIUM SERPL-SCNC: 140 MMOL/L — SIGNIFICANT CHANGE UP (ref 135–145)
WBC # BLD: 9.94 K/UL — SIGNIFICANT CHANGE UP (ref 3.8–10.5)
WBC # FLD AUTO: 9.94 K/UL — SIGNIFICANT CHANGE UP (ref 3.8–10.5)

## 2022-06-26 RX ADMIN — CHLORHEXIDINE GLUCONATE 15 MILLILITER(S): 213 SOLUTION TOPICAL at 18:20

## 2022-06-26 RX ADMIN — CHLORHEXIDINE GLUCONATE 15 MILLILITER(S): 213 SOLUTION TOPICAL at 05:35

## 2022-06-26 RX ADMIN — ENOXAPARIN SODIUM 40 MILLIGRAM(S): 100 INJECTION SUBCUTANEOUS at 14:37

## 2022-06-26 NOTE — PROGRESS NOTE ADULT - ASSESSMENT
Pt is a 42F s/p mandibulectomy with OMFS and reconstruction with free fibula flap on 6/21, since doing well    Plan  - flap checks q4 with cook  - Peridex for oral hygiene  - continue drains, monitor output  - remainder care per SICU/primary    #[id number]

## 2022-06-26 NOTE — PROGRESS NOTE ADULT - SUBJECTIVE AND OBJECTIVE BOX
SUBJECTIVE:  6/22: NGT removed yesterday. [place] removed. No issues overnight.  6/23: removed NGT again yesterday. naeon   6/24: No issues overnight. Tolerating NGT feeds.  6/25: No events overnight, slightly tachycardic. Feels well this AM.   6/26: naeon, doing well. tachycardia mildly improved     Vital Signs Last 24 Hrs  T(C): 36.9 (24 Jun 2022 14:51), Max: 37.4 (23 Jun 2022 20:00)  T(F): 98.5 (24 Jun 2022 14:51), Max: 99.4 (23 Jun 2022 20:00)  HR: 118 (24 Jun 2022 14:51) (69 - 118)  BP: 141/88 (24 Jun 2022 14:51) (99/81 - 141/88)  BP(mean): 94 (24 Jun 2022 12:00) (86 - 108)  RR: 16 (24 Jun 2022 14:51) (12 - 20)  SpO2: 99% (24 Jun 2022 14:51) (97% - 100%)    PE:  General: NAD, awake, alert, oriented  Neck: Incision c/d/i. LEVY SS. Neck soft/flat. No erythema or focal swelling. NGT in place.  Ext: L FFF donor site Ace wrapped.     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     - Admitted to OMFS  - consider starting clears per ERAS protocol  - Rest of care per OMFS  - Flap and donor site care per PRS

## 2022-06-26 NOTE — PROGRESS NOTE ADULT - ASSESSMENT
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   - ERAS protocol  - Tolerating bolus NG tube feeds + FLD.   - Ambulate as tolerated.   - PT eval: Anticipate discharge to rehabilitation facility   - Ambulate 3-4x/day  - Flap and donor site care per PRS.   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   - ERAS protocol  - Tolerating bolus NG tube feeds and CLD yesterday. Pt did not get Full liquids tray yesterday. Will advance to Full liquids today.    - Ambulate as tolerated.   - PT eval: Anticipate discharge to rehabilitation facility   - Ambulate 3-4x/day  - Flap and donor site care per PRS.   - Will initiate dispo planning with [facility] [id number]

## 2022-06-26 NOTE — PROGRESS NOTE ADULT - SUBJECTIVE AND OBJECTIVE BOX
ORAL MAXILLOFACIAL SURGERY PROGRESS NOTE  Pager: [phone]    STATUS POST:  Mandibulectomy, reconstruction of the mandible with transosteal bone implant, Right neck dissection and a left lower leg free flap.     POST OPERATIVE DAY #5    SUBJECTIVE: No acute events overnight. Patient seen at admitting floor bed. Tolerating bolus NGT feeds and tolerating PO full liquids diet. Tachy (100-110s) during late afternoon yesterday, 1L bolus LR, resolved. Pain is well-controlled. Ambulating.       INTERVAL EVENTS  6/22: NGT removed yesterday. [place] removed. No issues overnight.  6/23: removed NGT again yesterday. naeon   6/24: No issues overnight. Tolerating NGT feeds.  6/25: NAEON, Step down from SICU to surgical floor. Tolerating bolus NGT feeds and tolerating PO clear liquids. Pain is well-controlled. Ambulating.   6/26: NAEON, Tolerating NGT feeds and Full liquid diet. Pain well controlled. Ambulating.     ______________________________________________  OBJECTIVE:   Vital Signs Last 24 Hrs  T(C): 36.7 (26 Jun 2022 05:00), Max: 36.8 (25 Jun 2022 10:02)  T(F): 98 (26 Jun 2022 05:00), Max: 98.2 (25 Jun 2022 10:02)  HR: 89 (26 Jun 2022 05:00) (89 - 111)  BP: 110/66 (26 Jun 2022 05:00) (100/62 - 132/78)  RR: 17 (26 Jun 2022 05:00) (17 - 18)  SpO2: 99% (26 Jun 2022 05:00) (96% - 100%)      Physical exam:  Gen: AAOx3, NAD, Communicative  Nose: NGT sutured at left  Neck: Incision c/d/i. LEVY Right neck with SS output. Neck soft/flat. No erythema or focal swelling. Intra-oral flap warm with strong doppler signal. Hemostatic. LEVY removed.   Ext: L FFF donor site Ace wrapped. LEVY drain with SS output.                                      12.2   10.18 )-----------( 258      ( 25 Jun 2022 05:20 )             36.7       06-25    139  |  101  |  12  ----------------------------<  111<H>  3.9   |  25  |  0.52    Ca    9.4      25 Jun 2022 05:20  Phos  4.5     06-25  Mg     2.10     06-25          CAPILLARY BLOOD GLUCOSE      POCT Blood Glucose.: 132 mg/dL (26 Jun 2022 05:50)      CAPILLARY BLOOD GLUCOSE      POCT Blood Glucose.: 114 mg/dL (25 Jun 2022 05:18)        I&O's Detail    25 Jun 2022 07:01  -  26 Jun 2022 07:00  --------------------------------------------------------  IN:    Jevity 1.2: 720 mL    Lactated Ringers Bolus: 1000 mL    Oral Fluid: 250 mL  Total IN: 1970 mL    OUT:    Bulb (mL): 17.5 mL    Bulb (mL): 17.5 mL  Total OUT: 35 mL    Total NET: 1935 mL                     ORAL MAXILLOFACIAL SURGERY PROGRESS NOTE  Pager: [phone]    STATUS POST:  Mandibulectomy, reconstruction of the mandible with transosteal bone implant, Right neck dissection and a left lower leg free flap.     POST OPERATIVE DAY #5    SUBJECTIVE: No acute events overnight. Patient seen at admitting floor bed. Tolerating bolus NGT feeds and tolerating PO clear liquids diet yesterday. Patient did not get FLD tray yesterday as ordered. Tachy (100-110s) during late afternoon yesterday, 1L bolus LR, resolved. Pain is well-controlled. Ambulating.       INTERVAL EVENTS  6/22: NGT removed yesterday. [place] removed. No issues overnight.  6/23: removed NGT again yesterday. naeon   6/24: No issues overnight. Tolerating NGT feeds.  6/25: NAEON, Step down from SICU to surgical floor. Tolerating bolus NGT feeds and tolerating PO clear liquids. Pain is well-controlled. Ambulating.   6/26: NAEON, Tolerating NGT feeds and Clear liquid diet. Pain well controlled. Ambulating.     ______________________________________________  OBJECTIVE:   Vital Signs Last 24 Hrs  T(C): 36.7 (26 Jun 2022 05:00), Max: 36.8 (25 Jun 2022 10:02)  T(F): 98 (26 Jun 2022 05:00), Max: 98.2 (25 Jun 2022 10:02)  HR: 89 (26 Jun 2022 05:00) (89 - 111)  BP: 110/66 (26 Jun 2022 05:00) (100/62 - 132/78)  RR: 17 (26 Jun 2022 05:00) (17 - 18)  SpO2: 99% (26 Jun 2022 05:00) (96% - 100%)      Physical exam:  Gen: AAOx3, NAD, Communicative  Nose: NGT sutured at left  Neck: Incision c/d/i. LEVY Right neck with SS output. Neck soft/flat. No erythema or focal swelling. Intra-oral flap warm with strong doppler signal. Hemostatic. LEVY removed.   Ext: L FFF donor site Ace wrapped. LEVY drain with SS output.                                      12.2   10.18 )-----------( 258      ( 25 Jun 2022 05:20 )             36.7       06-25    139  |  101  |  12  ----------------------------<  111<H>  3.9   |  25  |  0.52    Ca    9.4      25 Jun 2022 05:20  Phos  4.5     06-25  Mg     2.10     06-25          CAPILLARY BLOOD GLUCOSE      POCT Blood Glucose.: 132 mg/dL (26 Jun 2022 05:50)      CAPILLARY BLOOD GLUCOSE      POCT Blood Glucose.: 114 mg/dL (25 Jun 2022 05:18)        I&O's Detail    25 Jun 2022 07:01  -  26 Jun 2022 07:00  --------------------------------------------------------  IN:    Jevity 1.2: 720 mL    Lactated Ringers Bolus: 1000 mL    Oral Fluid: 250 mL  Total IN: 1970 mL    OUT:    Bulb (mL): 17.5 mL    Bulb (mL): 17.5 mL  Total OUT: 35 mL    Total NET: 1935 mL

## 2022-06-27 LAB
ANION GAP SERPL CALC-SCNC: 11 MMOL/L — SIGNIFICANT CHANGE UP (ref 7–14)
BUN SERPL-MCNC: 12 MG/DL — SIGNIFICANT CHANGE UP (ref 7–23)
CALCIUM SERPL-MCNC: 9.1 MG/DL — SIGNIFICANT CHANGE UP (ref 8.4–10.5)
CHLORIDE SERPL-SCNC: 101 MMOL/L — SIGNIFICANT CHANGE UP (ref 98–107)
CO2 SERPL-SCNC: 27 MMOL/L — SIGNIFICANT CHANGE UP (ref 22–31)
CREAT SERPL-MCNC: 0.53 MG/DL — SIGNIFICANT CHANGE UP (ref 0.5–1.3)
EGFR: 117 ML/MIN/1.73M2 — SIGNIFICANT CHANGE UP
GLUCOSE BLDC GLUCOMTR-MCNC: 117 MG/DL — HIGH (ref 70–99)
GLUCOSE BLDC GLUCOMTR-MCNC: 156 MG/DL — HIGH (ref 70–99)
GLUCOSE BLDC GLUCOMTR-MCNC: 159 MG/DL — HIGH (ref 70–99)
GLUCOSE BLDC GLUCOMTR-MCNC: 176 MG/DL — HIGH (ref 70–99)
GLUCOSE SERPL-MCNC: 110 MG/DL — HIGH (ref 70–99)
HCT VFR BLD CALC: 34.2 % — LOW (ref 34.5–45)
HGB BLD-MCNC: 11.3 G/DL — LOW (ref 11.5–15.5)
MAGNESIUM SERPL-MCNC: 2.2 MG/DL — SIGNIFICANT CHANGE UP (ref 1.6–2.6)
MCHC RBC-ENTMCNC: 30.1 PG — SIGNIFICANT CHANGE UP (ref 27–34)
MCHC RBC-ENTMCNC: 33 GM/DL — SIGNIFICANT CHANGE UP (ref 32–36)
MCV RBC AUTO: 91 FL — SIGNIFICANT CHANGE UP (ref 80–100)
NRBC # BLD: 0 /100 WBCS — SIGNIFICANT CHANGE UP
NRBC # FLD: 0 K/UL — SIGNIFICANT CHANGE UP
PHOSPHATE SERPL-MCNC: 4.4 MG/DL — SIGNIFICANT CHANGE UP (ref 2.5–4.5)
PLATELET # BLD AUTO: 276 K/UL — SIGNIFICANT CHANGE UP (ref 150–400)
POTASSIUM SERPL-MCNC: 4.2 MMOL/L — SIGNIFICANT CHANGE UP (ref 3.5–5.3)
POTASSIUM SERPL-SCNC: 4.2 MMOL/L — SIGNIFICANT CHANGE UP (ref 3.5–5.3)
RBC # BLD: 3.76 M/UL — LOW (ref 3.8–5.2)
RBC # FLD: 12.3 % — SIGNIFICANT CHANGE UP (ref 10.3–14.5)
SODIUM SERPL-SCNC: 139 MMOL/L — SIGNIFICANT CHANGE UP (ref 135–145)
WBC # BLD: 10.52 K/UL — HIGH (ref 3.8–10.5)
WBC # FLD AUTO: 10.52 K/UL — HIGH (ref 3.8–10.5)

## 2022-06-27 RX ORDER — INSULIN LISPRO 100/ML
VIAL (ML) SUBCUTANEOUS AT BEDTIME
Refills: 0 | Status: DISCONTINUED | OUTPATIENT
Start: 2022-06-27 | End: 2022-06-28

## 2022-06-27 RX ORDER — INSULIN LISPRO 100/ML
VIAL (ML) SUBCUTANEOUS
Refills: 0 | Status: DISCONTINUED | OUTPATIENT
Start: 2022-06-27 | End: 2022-06-28

## 2022-06-27 RX ADMIN — Medication 2: at 17:41

## 2022-06-27 RX ADMIN — CHLORHEXIDINE GLUCONATE 15 MILLILITER(S): 213 SOLUTION TOPICAL at 17:41

## 2022-06-27 RX ADMIN — CHLORHEXIDINE GLUCONATE 15 MILLILITER(S): 213 SOLUTION TOPICAL at 05:56

## 2022-06-27 RX ADMIN — ENOXAPARIN SODIUM 40 MILLIGRAM(S): 100 INJECTION SUBCUTANEOUS at 13:30

## 2022-06-27 NOTE — PROGRESS NOTE ADULT - SUBJECTIVE AND OBJECTIVE BOX
Plastic Surgery Progress Note (pg [facility]: [id number], [facility]: [phone])    SUBJECTIVE  The patient was seen and examined. No acute events overnight.    OBJECTIVE  ___________________________________________________  VITAL SIGNS / I&O's   Vital Signs Last 24 Hrs  T(C): 36.4 (27 Jun 2022 06:00), Max: 37.2 (26 Jun 2022 10:00)  T(F): 97.6 (27 Jun 2022 06:00), Max: 99 (26 Jun 2022 10:00)  HR: 84 (27 Jun 2022 06:00) (84 - 98)  BP: 118/77 (27 Jun 2022 06:00) (114/77 - 142/85)  BP(mean): --  RR: 18 (27 Jun 2022 06:00) (17 - 19)  SpO2: 98% (27 Jun 2022 06:00) (98% - 100%)      26 Jun 2022 07:01  -  27 Jun 2022 07:00  --------------------------------------------------------  IN:    Enteral Tube Flush: 400 mL    Jevity 1.2: 895 mL  Total IN: 1295 mL    OUT:    Bulb (mL): 49 mL    IV PiggyBack: 0 mL    Oral Fluid: 0 mL    Voided (mL): 0 mL  Total OUT: 49 mL    Total NET: 1246 mL        ___________________________________________________  PHYSICAL EXAM    -- CONSTITUTIONAL: NAD  -- HEENT: flap soft and pink with 2-3 second capillary refill. + Cook doppler, cook removed  -- NECK: flat, LEVY serosang, incision c/d/i  -- RESPIRATORY: nonlabored respirations  -- EXTREMITIES: Lower leg: dressing in place c/d/i, drain with s/s output, soft, SILT     ___________________________________________________  LABS                        12.1   9.94  )-----------( 292      ( 26 Jun 2022 05:53 )             36.2     26 Jun 2022 05:53    140    |  101    |  13     ----------------------------<  140    3.9     |  26     |  0.56     Ca    9.2        26 Jun 2022 05:53  Phos  3.7       26 Jun 2022 05:53  Mg     2.10      26 Jun 2022 05:53        CAPILLARY BLOOD GLUCOSE      POCT Blood Glucose.: 176 mg/dL (27 Jun 2022 07:03)  POCT Blood Glucose.: 134 mg/dL (26 Jun 2022 23:36)  POCT Blood Glucose.: 146 mg/dL (26 Jun 2022 21:09)  POCT Blood Glucose.: 127 mg/dL (26 Jun 2022 17:07)  POCT Blood Glucose.: 133 mg/dL (26 Jun 2022 12:47)          ___________________________________________________  MICRO  Recent Cultures:    ___________________________________________________  MEDICATIONS  (STANDING):  chlorhexidine 0.12% Liquid 15 milliLiter(s) Swish and Spit two times a day  enoxaparin Injectable 40 milliGRAM(s) SubCutaneous every 24 hours  insulin lispro (ADMELOG) corrective regimen sliding scale   SubCutaneous every 6 hours    MEDICATIONS  (PRN):  acetaminophen    Suspension .. 975 milliGRAM(s) Oral every 6 hours PRN Temp greater or equal to 38C (100.4F), Moderate Pain (4 - 6)  oxyCODONE    Solution 5 milliGRAM(s) Oral every 6 hours PRN Moderate Pain (4 - 6)  oxyCODONE    Solution 10 milliGRAM(s) Oral every 6 hours PRN Severe Pain (7 - 10)

## 2022-06-27 NOTE — PROGRESS NOTE ADULT - ASSESSMENT
Pt is a 42F s/p mandibulectomy with OMFS and reconstruction with free fibula flap on 6/21, since doing well    Plan  - flap checks q4 with clinical exam, kathleen removed this AM  - BID scratch for today  - Peridex for oral hygiene  - continue drains, monitor output  - remainder care per primary    #[id number]

## 2022-06-27 NOTE — PROGRESS NOTE ADULT - SUBJECTIVE AND OBJECTIVE BOX
ORAL MAXILLOFACIAL SURGERY PROGRESS NOTE  Pager: [phone]    STATUS POST:  Mandibulectomy, reconstruction of the mandible with transosteal bone implant, Right neck dissection and a left lower leg free flap.     POST OPERATIVE DAY #6    SUBJECTIVE: No acute events overnight. Patient seen at admitting floor bed. Tolerating bolus NGT feeds and tolerating PO clear liquids diet yesterday. Patient did not get FLD tray yesterday as ordered. Tachy (100-110s) during late afternoon yesterday, 1L bolus LR, resolved. Pain is well-controlled. Ambulating.       INTERVAL EVENTS  6/22: NGT removed yesterday. [place] removed. No issues overnight.  6/23: removed NGT again yesterday. naeon   6/24: No issues overnight. Tolerating NGT feeds.  6/25: NAEON, Step down from SICU to surgical floor. Tolerating bolus NGT feeds and tolerating PO clear liquids. Pain is well-controlled. Ambulating.   6/26: NAEON, Tolerating NGT feeds and Clear liquid diet. Pain well controlled. Ambulating.   6/27: NAEON.  Tolerating Full liquid diet. Left leg LEVY 49 output.    ______________________________________________  OBJECTIVE:   ICU Vital Signs Last 24 Hrs  T(C): 36.5 (27 Jun 2022 13:35), Max: 36.9 (26 Jun 2022 21:31)  T(F): 97.7 (27 Jun 2022 13:35), Max: 98.5 (26 Jun 2022 21:31)  HR: 99 (27 Jun 2022 13:35) (84 - 100)  BP: 122/89 (27 Jun 2022 13:35) (118/77 - 142/85)  BP(mean): --  ABP: --  ABP(mean): --  RR: 18 (27 Jun 2022 13:35) (17 - 19)  SpO2: 100% (27 Jun 2022 13:35) (98% - 100%)      Physical exam:  Gen: AAOx3, NAD, Communicative  Nose: NGT sutured at left  Neck: Incision c/d/i. LEVY Right neck with SS output. Neck soft/flat. No erythema or focal swelling. Intra-oral flap warm with strong doppler signal. Hemostatic. LEVY removed.   Ext: L FFF donor site Ace wrapped. LEVY drain with SS output.                            11.3   10.52 )-----------( 276      ( 27 Jun 2022 05:40 )             34.2       06-27    139  |  101  |  12  ----------------------------<  110<H>  4.2   |  27  |  0.53    Ca    9.1      27 Jun 2022 05:40  Phos  4.4     06-27  Mg     2.20     06-27      ( 22 Jun 2022 00:52 ) pH: 7.39  /  pCO2: 35    /  pO2: 110   / HCO3: 21    / Base Excess: -3.2  /  SaO2: 99.3      ( 21 Jun 2022 20:38 ) pH: 7.30  /  pCO2: 42    /  pO2: 81    / HCO3: 21    / Base Excess: -5.5  /  SaO2: 97.1      ( 21 Jun 2022 18:45 ) pH: 7.28  /  pCO2: 43    /  pO2: 149   / HCO3: 20    / Base Excess: -6.3  /  SaO2: 99.3          CAPILLARY BLOOD GLUCOSE      POCT Blood Glucose.: 132 mg/dL (26 Jun 2022 05:50)      CAPILLARY BLOOD GLUCOSE      POCT Blood Glucose.: 114 mg/dL (25 Jun 2022 05:18)      I&O's Detail    26 Jun 2022 07:01  -  27 Jun 2022 07:00  --------------------------------------------------------  IN:    Enteral Tube Flush: 400 mL    Jevity 1.2: 895 mL  Total IN: 1295 mL    OUT:    Bulb (mL): 49 mL    IV PiggyBack: 0 mL    Oral Fluid: 0 mL    Voided (mL): 0 mL  Total OUT: 49 mL    Total NET: 1246 mL      27 Jun 2022 07:01  -  27 Jun 2022 16:33  --------------------------------------------------------  IN:    Oral Fluid: 240 mL  Total IN: 240 mL    OUT:    Bulb (mL): 7.5 mL  Total OUT: 7.5 mL    Total NET: 232.5 mL

## 2022-06-27 NOTE — PROGRESS NOTE ADULT - ASSESSMENT
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   - ERAS protocol  - Tolerating bolus NG tube feeds and FLD yesterday. Will consider removing NGT  - Ambulate as tolerated.   - PT eval: Anticipate discharge to rehabilitation facility   - Ambulate 3-4x/day  - Flap and donor site care per PRS.   - LEVY teaching if indicated.   - [id number]

## 2022-06-28 ENCOUNTER — TRANSCRIPTION ENCOUNTER (OUTPATIENT)
Age: 42
End: 2022-06-28

## 2022-06-28 VITALS
SYSTOLIC BLOOD PRESSURE: 113 MMHG | DIASTOLIC BLOOD PRESSURE: 80 MMHG | HEART RATE: 87 BPM | OXYGEN SATURATION: 97 % | TEMPERATURE: 98 F | RESPIRATION RATE: 18 BRPM

## 2022-06-28 LAB
ANION GAP SERPL CALC-SCNC: 10 MMOL/L — SIGNIFICANT CHANGE UP (ref 7–14)
BUN SERPL-MCNC: 11 MG/DL — SIGNIFICANT CHANGE UP (ref 7–23)
CALCIUM SERPL-MCNC: 8.8 MG/DL — SIGNIFICANT CHANGE UP (ref 8.4–10.5)
CHLORIDE SERPL-SCNC: 99 MMOL/L — SIGNIFICANT CHANGE UP (ref 98–107)
CO2 SERPL-SCNC: 27 MMOL/L — SIGNIFICANT CHANGE UP (ref 22–31)
CREAT SERPL-MCNC: 0.59 MG/DL — SIGNIFICANT CHANGE UP (ref 0.5–1.3)
EGFR: 114 ML/MIN/1.73M2 — SIGNIFICANT CHANGE UP
GLUCOSE BLDC GLUCOMTR-MCNC: 121 MG/DL — HIGH (ref 70–99)
GLUCOSE BLDC GLUCOMTR-MCNC: 98 MG/DL — SIGNIFICANT CHANGE UP (ref 70–99)
GLUCOSE SERPL-MCNC: 131 MG/DL — HIGH (ref 70–99)
HCT VFR BLD CALC: 34.1 % — LOW (ref 34.5–45)
HGB BLD-MCNC: 11.5 G/DL — SIGNIFICANT CHANGE UP (ref 11.5–15.5)
MAGNESIUM SERPL-MCNC: 2.1 MG/DL — SIGNIFICANT CHANGE UP (ref 1.6–2.6)
MCHC RBC-ENTMCNC: 30.9 PG — SIGNIFICANT CHANGE UP (ref 27–34)
MCHC RBC-ENTMCNC: 33.7 GM/DL — SIGNIFICANT CHANGE UP (ref 32–36)
MCV RBC AUTO: 91.7 FL — SIGNIFICANT CHANGE UP (ref 80–100)
NRBC # BLD: 0 /100 WBCS — SIGNIFICANT CHANGE UP
NRBC # FLD: 0 K/UL — SIGNIFICANT CHANGE UP
PHOSPHATE SERPL-MCNC: 3.3 MG/DL — SIGNIFICANT CHANGE UP (ref 2.5–4.5)
PLATELET # BLD AUTO: 275 K/UL — SIGNIFICANT CHANGE UP (ref 150–400)
POTASSIUM SERPL-MCNC: 4 MMOL/L — SIGNIFICANT CHANGE UP (ref 3.5–5.3)
POTASSIUM SERPL-SCNC: 4 MMOL/L — SIGNIFICANT CHANGE UP (ref 3.5–5.3)
RBC # BLD: 3.72 M/UL — LOW (ref 3.8–5.2)
RBC # FLD: 12.3 % — SIGNIFICANT CHANGE UP (ref 10.3–14.5)
SODIUM SERPL-SCNC: 136 MMOL/L — SIGNIFICANT CHANGE UP (ref 135–145)
SURGICAL PATHOLOGY STUDY: SIGNIFICANT CHANGE UP
WBC # BLD: 11.3 K/UL — HIGH (ref 3.8–10.5)
WBC # FLD AUTO: 11.3 K/UL — HIGH (ref 3.8–10.5)

## 2022-06-28 RX ORDER — CHLORHEXIDINE GLUCONATE 213 G/1000ML
15 SOLUTION TOPICAL
Qty: 450 | Refills: 0
Start: 2022-06-28 | End: 2022-07-12

## 2022-06-28 RX ORDER — OXYCODONE HYDROCHLORIDE 5 MG/1
5 TABLET ORAL
Qty: 60 | Refills: 0
Start: 2022-06-28 | End: 2022-06-30

## 2022-06-28 RX ADMIN — CHLORHEXIDINE GLUCONATE 15 MILLILITER(S): 213 SOLUTION TOPICAL at 05:49

## 2022-06-28 RX ADMIN — ENOXAPARIN SODIUM 40 MILLIGRAM(S): 100 INJECTION SUBCUTANEOUS at 13:09

## 2022-06-28 NOTE — DISCHARGE NOTE PROVIDER - PROVIDER TOKENS
PROVIDER:[TOKEN:[[id number]],SCHEDULEDAPPT:[07/01/2022]] PROVIDER:[TOKEN:[[id number]],SCHEDULEDAPPT:[07/06/2022],SCHEDULEDAPPTTIME:[11:30 AM],ESTABLISHEDPATIENT:[T]],PROVIDER:[TOKEN:[[id number]],FOLLOWUP:[1 week]]

## 2022-06-28 NOTE — PROGRESS NOTE ADULT - SUBJECTIVE AND OBJECTIVE BOX
Plastic Surgery Progress Note (pg [facility]: [id number], [facility]: [phone])    SUBJECTIVE  The patient was seen and examined. No acute events overnight.    OBJECTIVE  ___________________________________________________  VITAL SIGNS / I&O's   Vital Signs Last 24 Hrs  T(C): 36.4 (28 Jun 2022 06:00), Max: 36.9 (27 Jun 2022 09:23)  T(F): 97.6 (28 Jun 2022 06:00), Max: 98.5 (27 Jun 2022 09:23)  HR: 85 (28 Jun 2022 06:00) (85 - 103)  BP: 112/75 (28 Jun 2022 06:00) (107/71 - 122/89)  BP(mean): --  RR: 18 (28 Jun 2022 06:00) (17 - 18)  SpO2: 98% (28 Jun 2022 06:00) (98% - 100%)      27 Jun 2022 07:01  -  28 Jun 2022 07:00  --------------------------------------------------------  IN:    Oral Fluid: 700 mL  Total IN: 700 mL    OUT:    Bulb (mL): 17.5 mL    IV PiggyBack: 0 mL  Total OUT: 17.5 mL    Total NET: 682.5 mL        ___________________________________________________  PHYSICAL EXAM    -- CONSTITUTIONAL: NAD  -- HEENT: flap soft and pink with 2-3 second capillary refill. + scratch  -- NECK: flat, LEVY serosang, incision c/d/i  -- RESPIRATORY: nonlabored respirations  -- EXTREMITIES: Lower leg: dressing in place c/d/i, drain with s/s output, soft, SILT     ___________________________________________________  LABS                        11.5   11.30 )-----------( 275      ( 28 Jun 2022 07:01 )             34.1     27 Jun 2022 05:40    139    |  101    |  12     ----------------------------<  110    4.2     |  27     |  0.53     Ca    9.1        27 Jun 2022 05:40  Phos  4.4       27 Jun 2022 05:40  Mg     2.20      27 Jun 2022 05:40        CAPILLARY BLOOD GLUCOSE      POCT Blood Glucose.: 159 mg/dL (27 Jun 2022 22:06)  POCT Blood Glucose.: 156 mg/dL (27 Jun 2022 17:17)  POCT Blood Glucose.: 117 mg/dL (27 Jun 2022 12:31)          ___________________________________________________  MICRO  Recent Cultures:    ___________________________________________________  MEDICATIONS  (STANDING):  chlorhexidine 0.12% Liquid 15 milliLiter(s) Swish and Spit two times a day  enoxaparin Injectable 40 milliGRAM(s) SubCutaneous every 24 hours  insulin lispro (ADMELOG) corrective regimen sliding scale   SubCutaneous three times a day before meals  insulin lispro (ADMELOG) corrective regimen sliding scale   SubCutaneous at bedtime    MEDICATIONS  (PRN):  acetaminophen    Suspension .. 975 milliGRAM(s) Oral every 6 hours PRN Temp greater or equal to 38C (100.4F), Moderate Pain (4 - 6)  oxyCODONE    Solution 10 milliGRAM(s) Oral every 6 hours PRN Severe Pain (7 - 10)  oxyCODONE    Solution 5 milliGRAM(s) Oral every 6 hours PRN Moderate Pain (4 - 6)

## 2022-06-28 NOTE — PROGRESS NOTE ADULT - ASSESSMENT
Pt is a 42F s/p mandibulectomy with OMFS and reconstruction with free fibula flap on 6/21, since doing well    Plan  - flap checks q4 with clinical exam  - Peridex for oral hygiene  - continue drains, monitor output  - remainder care per primary    #[id number]

## 2022-06-28 NOTE — DISCHARGE NOTE PROVIDER - NSDCMRMEDTOKEN_GEN_ALL_CORE_FT
chlorhexidine 0.12% mucous membrane liquid: 15 milliliter(s) mucous membrane 2 times a day   oxyCODONE 5 mg/5 mL oral solution: 5 milliliter(s) orally every 6 hours MDD:20 ML  Rolling walker: 1 unit(s)  once a day    chlorhexidine 0.12% mucous membrane liquid: 15 milliliter(s) mucous membrane 2 times a day   oxyCODONE 5 mg/5 mL oral solution: 5 milliliter(s) orally every 6 hours MDD:20 ML   Augmentin 250 mg-62.5 mg/5 mL oral liquid: 10 milliliter(s) orally 2 times a day for 7 days   chlorhexidine 0.12% mucous membrane liquid: 15 milliliter(s) mucous membrane 2 times a day   oxyCODONE 5 mg/5 mL oral solution: 5 milliliter(s) orally every 6 hours MDD:20 ML

## 2022-06-28 NOTE — PROGRESS NOTE ADULT - PROVIDER SPECIALTY LIST ADULT
Anesthesia
ENT
OMFS
Plastic Surgery
Plastic Surgery
ENT
OMFS
OMFS
Plastic Surgery
Plastic Surgery
SICU
SICU
OMFS
Plastic Surgery
SICU

## 2022-06-28 NOTE — PROGRESS NOTE ADULT - SUBJECTIVE AND OBJECTIVE BOX
SUBJECTIVE:  6/22: NGT removed yesterday. [place] removed. No issues overnight.  6/23: removed NGT again yesterday. naeon   6/24: No issues overnight. Tolerating NGT feeds.  6/25: No events overnight, slightly tachycardic. Feels well this AM.   6/26: naeon, doing well. tachycardia mildly improved   6/27: No issues overnight. Tolerating full liquid diet.   6/28: No issues overnight. Dispo planning.    Vital Signs Last 24 Hrs  T(C): 36.4 (28 Jun 2022 06:00), Max: 36.9 (27 Jun 2022 09:23)  T(F): 97.6 (28 Jun 2022 06:00), Max: 98.5 (27 Jun 2022 09:23)  HR: 85 (28 Jun 2022 06:00) (85 - 103)  BP: 112/75 (28 Jun 2022 06:00) (107/71 - 122/89)  BP(mean): --  RR: 18 (28 Jun 2022 06:00) (17 - 18)  SpO2: 98% (28 Jun 2022 06:00) (98% - 100%)    PE:  General: NAD, awake, alert, oriented  Neck: Incision c/d/i. Neck soft/flat. No erythema or focal swelling. NGT in place.  Ext: L FFF donor site Ace wrapped.     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     - Admitted to OMFS  - advance diet per OMFS  - Rest of care per OMFS  - Flap and donor site care per PRS  - Recommend dispo planning for rehab

## 2022-06-28 NOTE — DISCHARGE NOTE PROVIDER - CARE PROVIDER_API CALL
Osvaldo Bradshaw (DDS; MD)  OralMaxillofacial Surgery  [address] [place] [address]  Phone: [phone]  Fax: [phone]  Scheduled Appointment: 07/01/2022   Osvaldo Bradshaw (DDS; MD)  OralMaxillofacial Surgery  [address] [place] [address]  Phone: [phone]  Fax: [phone]  Established Patient  Scheduled Appointment: 07/06/2022 11:30 AM    Bruce Garcia)  [facility]; Otolaryngology  [address] [place] [address]  Phone: [phone]  Fax: [phone]  Follow Up Time: 1 week

## 2022-06-28 NOTE — DISCHARGE NOTE NURSING/CASE MANAGEMENT/SOCIAL WORK - PATIENT PORTAL LINK FT
You can access the FollowMyHealth Patient Portal offered by [facility] by registering at the following website: [email or web address]. By joining [employer]’s FollowMyHealth portal, you will also be able to view your health information using other applications (apps) compatible with our system.

## 2022-06-28 NOTE — DISCHARGE NOTE PROVIDER - CARE PROVIDERS DIRECT ADDRESSES
,DirectAddress_Unknown ,DirectAddress_Unknown,[email or web address]

## 2022-06-28 NOTE — DISCHARGE NOTE PROVIDER - NSDCCPTREATMENT_GEN_ALL_CORE_FT
PRINCIPAL PROCEDURE  Procedure: Mandibulectomy  Findings and Treatment:       SECONDARY PROCEDURE  Procedure: Application of fibular graft to mandible  Findings and Treatment:

## 2022-06-28 NOTE — DISCHARGE NOTE PROVIDER - HOSPITAL COURSE
STATUS POST:  Mandibulectomy, reconstruction of the mandible with transosteal bone implant, Right neck dissection and a left lower leg free flap.     POST OPERATIVE DAY #6    SUBJECTIVE: No acute events overnight. Patient seen at admitting floor bed. Tolerating bolus NGT feeds and tolerating PO clear liquids diet yesterday. Patient did not get FLD tray yesterday as ordered. Tachy (100-110s) during late afternoon yesterday, 1L bolus LR, resolved. Pain is well-controlled. Ambulating.       INTERVAL EVENTS  6/22: NGT removed yesterday. [place] removed. No issues overnight.  6/23: removed NGT again yesterday. naeon   6/24: No issues overnight. Tolerating NGT feeds.  6/25: NAEON, Step down from SICU to surgical floor. Tolerating bolus NGT feeds and tolerating PO clear liquids. Pain is well-controlled. Ambulating.   6/26: NAEON, Tolerating NGT feeds and Clear liquid diet. Pain well controlled. Ambulating.   6/27: NAEON.  Tolerating Full liquid diet. Left leg LEVY 49 output.  6/28: NAEON. LEVY. D/cd today.    ICU Vital Signs Last 24 Hrs  T(C): 36.4 (28 Jun 2022 06:00), Max: 36.9 (27 Jun 2022 09:23)  T(F): 97.6 (28 Jun 2022 06:00), Max: 98.5 (27 Jun 2022 09:23)  HR: 85 (28 Jun 2022 06:00) (85 - 103)  BP: 112/75 (28 Jun 2022 06:00) (107/71 - 122/89)  BP(mean): --  ABP: --  ABP(mean): --  RR: 18 (28 Jun 2022 06:00) (17 - 18)  SpO2: 98% (28 Jun 2022 06:00) (98% - 100%)    Physical exam:  Gen: AAOx3, NAD, Communicative  Nose: NGT dc/d  Neck: Incision c/d/i.  Neck soft/flat. No erythema or focal swelling. Intra-oral flap warm with strong doppler signal. Hemostatic. LEVY removed.   Ext: L FFF donor site Ace wrapped. LEVY Removed STATUS POST:  Mandibulectomy, reconstruction of the mandible with transosteal bone implant, Right neck dissection and a left lower leg free flap.     POST OPERATIVE DAY #6    SUBJECTIVE: No acute events overnight. Patient seen at admitting floor bed. Tolerating bolus NGT feeds and tolerating PO clear liquids diet yesterday. Patient did not get FLD tray yesterday as ordered. Tachy (100-110s) during late afternoon yesterday, 1L bolus LR, resolved. Pain is well-controlled. Ambulating.       INTERVAL EVENTS  6/22: NGT removed yesterday. [place] removed. No issues overnight.  6/23: removed NGT again yesterday. naeon   6/24: No issues overnight. Tolerating NGT feeds.  6/25: NAEON, Step down from SICU to surgical floor. Tolerating bolus NGT feeds and tolerating PO clear liquids. Pain is well-controlled. Ambulating.   6/26: NAEON, Tolerating NGT feeds and Clear liquid diet. Pain well controlled. Ambulating.   6/27: NAEON.  Tolerating Full liquid diet. Left leg LEVY 49 output.  6/28: NAEON. LEVY. D/cd today.    ICU Vital Signs Last 24 Hrs  T(C): 36.4 (28 Jun 2022 06:00), Max: 36.9 (27 Jun 2022 09:23)  T(F): 97.6 (28 Jun 2022 06:00), Max: 98.5 (27 Jun 2022 09:23)  HR: 85 (28 Jun 2022 06:00) (85 - 103)  BP: 112/75 (28 Jun 2022 06:00) (107/71 - 122/89)  BP(mean): --  ABP: --  ABP(mean): --  RR: 18 (28 Jun 2022 06:00) (17 - 18)  SpO2: 98% (28 Jun 2022 06:00) (98% - 100%)    Physical exam:  Gen: AAOx3, NAD, Communicative  Nose: NGT dc/d  Neck: Incision c/d/i.  Neck soft/flat. No erythema or focal swelling. Intra-oral flap warm with strong doppler signal. Hemostatic. LEVY removed.   Ext: L FFF donor site Ace wrapped. LEVY Removed    Of Note:    DISCHARGE PLANNING 201  Potential Discharge Plan and Services    Answers: Home    Notes: Potential Discharge Plan and Services    Notes: Pt discussed with OMFS. Pt s/p right mandibulectomy, hardware removal,  neck exploration, and left fibula free flap on 6/21/22. Pt on full liquid diet  with KO tube for enteral feeds. Pt being followed by PT, recommend home PT and  RW. Plan for home tomorrow after removal of KO tube. CM met with pt at bedside  to discuss d/c plan. Pt able to understand English, but mainly Maltese  speaking. Primary RN able to provide translation. Pt confirmed that she does  not have insurance and family to buy rolling walker. CM discussed [facility] services  (RN/PT), pt declined. Pt states her family will assist her at home as needed  and will assist with dressing changes to left fibula, if needed. Primary RN to  provide pt with supplies on day of discharge. Family to provide transportation  home on day of discharge. No CM interventions required.    SCREENING 201  Social Work Screen and Referral    Answers: None       Electronically signed by:  Autumn Bonilla RN [facility]  Electronically signed on:  2022-06-27  15:48   STATUS POST:  Mandibulectomy, reconstruction of the mandible with transosteal bone implant, Right neck dissection and a left lower leg free flap.     POST OPERATIVE DAY #7    SUBJECTIVE: No acute events overnight. Patient seen at admitting floor bed. Tolerating PO full liquid liquids diet. Pain is well-controlled. Ambulating.       INTERVAL EVENTS  6/22: NGT removed yesterday. [place] removed. No issues overnight.  6/23: removed NGT again yesterday. naeon   6/24: No issues overnight. Tolerating NGT feeds.  6/25: NAEON, Step down from SICU to surgical floor. Tolerating bolus NGT feeds and tolerating PO clear liquids. Pain is well-controlled. Ambulating.   6/26: NAEON, Tolerating NGT feeds and Clear liquid diet. Pain well controlled. Ambulating.   6/27: NAEON.  Tolerating Full liquid diet. Left leg LEVY 49 output.  6/28: NAEON. LEVY. D/cd today.    ICU Vital Signs Last 24 Hrs  T(C): 36.4 (28 Jun 2022 06:00), Max: 36.9 (27 Jun 2022 09:23)  T(F): 97.6 (28 Jun 2022 06:00), Max: 98.5 (27 Jun 2022 09:23)  HR: 85 (28 Jun 2022 06:00) (85 - 103)  BP: 112/75 (28 Jun 2022 06:00) (107/71 - 122/89)  BP(mean): --  ABP: --  ABP(mean): --  RR: 18 (28 Jun 2022 06:00) (17 - 18)  SpO2: 98% (28 Jun 2022 06:00) (98% - 100%)    Physical exam:  Gen: AAOx3, NAD, Communicative  Nose: NGT dc/d  Neck: Incision c/d/i.  Neck soft/flat. No erythema or focal swelling. Intra-oral flap warm with strong doppler signal. Hemostatic. LEVY removed.   Ext: L FFF donor site Ace wrapped. LEVY Removed    Of Note:    DISCHARGE PLANNING 201  Potential Discharge Plan and Services    Answers: Home    Notes: Potential Discharge Plan and Services    Notes: Pt discussed with OMFS. Pt s/p right mandibulectomy, hardware removal,  neck exploration, and left fibula free flap on 6/21/22. Pt on full liquid diet  with KO tube for enteral feeds. Pt being followed by PT, recommend home PT and  RW. Plan for home tomorrow after removal of KO tube. CM met with pt at bedside  to discuss d/c plan. Pt able to understand English, but mainly Yi  speaking. Primary RN able to provide translation. Pt confirmed that she does  not have insurance and family to buy rolling walker. CM discussed [facility] services  (RN/PT), pt declined. Pt states her family will assist her at home as needed  and will assist with dressing changes to left fibula, if needed. Primary RN to  provide pt with supplies on day of discharge. Family to provide transportation  home on day of discharge. No CM interventions required.    SCREENING 201  Social Work Screen and Referral    Answers: None       Electronically signed by:  Autumn Bonilla RN [facility]  Electronically signed on:  2022-06-27  15:48   STATUS POST:  Mandibulectomy, reconstruction of the mandible with transosteal bone implant, Right neck dissection and a left lower leg free flap.     POST OPERATIVE DAY #7    SUBJECTIVE: No acute events overnight. Patient seen at admitting floor bed. Tolerating PO full liquid diet. Pain is well-controlled. Ambulating.       INTERVAL EVENTS  6/22: NGT removed yesterday. [place] removed. No issues overnight.  6/23: removed NGT again yesterday. naeon   6/24: No issues overnight. Tolerating NGT feeds.  6/25: NAEON, Step down from SICU to surgical floor. Tolerating bolus NGT feeds and tolerating PO clear liquids. Pain is well-controlled. Ambulating.   6/26: NAEON, Tolerating NGT feeds and Clear liquid diet. Pain well controlled. Ambulating.   6/27: NAEON.  Tolerating Full liquid diet. Left leg LEVY 49 output.  6/28: NAEON. LEVY. D/cd today.    ICU Vital Signs Last 24 Hrs  T(C): 36.4 (28 Jun 2022 06:00), Max: 36.9 (27 Jun 2022 09:23)  T(F): 97.6 (28 Jun 2022 06:00), Max: 98.5 (27 Jun 2022 09:23)  HR: 85 (28 Jun 2022 06:00) (85 - 103)  BP: 112/75 (28 Jun 2022 06:00) (107/71 - 122/89)  BP(mean): --  ABP: --  ABP(mean): --  RR: 18 (28 Jun 2022 06:00) (17 - 18)  SpO2: 98% (28 Jun 2022 06:00) (98% - 100%)    Physical exam:  Gen: AAOx3, NAD, Communicative  Nose: NGT dc/d  Neck: Incision c/d/i.  Neck soft/flat. No erythema or focal swelling. Intra-oral flap warm with strong doppler signal. Hemostatic. LEVY removed.   Ext: L FFF donor site Ace wrapped. LEVY Removed    Of Note:    DISCHARGE PLANNING 201  Potential Discharge Plan and Services    Answers: Home    Notes: Potential Discharge Plan and Services    Notes: Pt discussed with OMFS. Pt s/p right mandibulectomy, hardware removal,  neck exploration, and left fibula free flap on 6/21/22. Pt on full liquid diet  with KO tube for enteral feeds. Pt being followed by PT, recommend home PT and  RW. Plan for home tomorrow after removal of KO tube. CM met with pt at bedside  to discuss d/c plan. Pt able to understand English, but mainly Niuean  speaking. Primary RN able to provide translation. Pt confirmed that she does  not have insurance and family to buy rolling walker. CM discussed [facility] services  (RN/PT), pt declined. Pt states her family will assist her at home as needed  and will assist with dressing changes to left fibula, if needed. Primary RN to  provide pt with supplies on day of discharge. Family to provide transportation  home on day of discharge. No CM interventions required.    SCREENING 201  Social Work Screen and Referral    Answers: None       Electronically signed by:  Autumn Bonilla RN [facility]  Electronically signed on:  2022-06-27  15:48   STATUS POST:  Mandibulectomy, reconstruction of the mandible with transosteal bone implant, Right neck dissection and a left lower leg free flap.     POST OPERATIVE DAY #7    SUBJECTIVE: No acute events overnight. Patient seen at admitting floor bed. Tolerating PO full liquid diet. Pain is well-controlled. Ambulating.       INTERVAL EVENTS  6/22: NGT removed yesterday. [place] removed. No issues overnight.  6/23: removed NGT again yesterday. naeon   6/24: No issues overnight. Tolerating NGT feeds.  6/25: NAEON, Step down from SICU to surgical floor. Tolerating bolus NGT feeds and tolerating PO clear liquids. Pain is well-controlled. Ambulating.   6/26: NAEON, Tolerating NGT feeds and Clear liquid diet. Pain well controlled. Ambulating.   6/27: NAEON.  Tolerating Full liquid diet. Left leg LEVY 49 output.  6/28: NAEON. LEVY. D/cd today.    ICU Vital Signs Last 24 Hrs  T(C): 36.4 (28 Jun 2022 06:00), Max: 36.9 (27 Jun 2022 09:23)  T(F): 97.6 (28 Jun 2022 06:00), Max: 98.5 (27 Jun 2022 09:23)  HR: 85 (28 Jun 2022 06:00) (85 - 103)  BP: 112/75 (28 Jun 2022 06:00) (107/71 - 122/89)  BP(mean): --  ABP: --  ABP(mean): --  RR: 18 (28 Jun 2022 06:00) (17 - 18)  SpO2: 98% (28 Jun 2022 06:00) (98% - 100%)    Physical exam:  Gen: AAOx3, NAD, Communicative  Nose: NGT dc/d  Neck: Incision c/d/i.  Neck soft/flat. No erythema or focal swelling. Intra-oral flap warm pink and well perfused.  Hemostatic. LEVY removed.   Ext: L FFF donor site Ace wrapped. LEVY Removed    Of Note:    DISCHARGE PLANNING 201  Potential Discharge Plan and Services    Answers: Home    Notes: Potential Discharge Plan and Services    Notes: Pt discussed with OMFS. Pt s/p right mandibulectomy, hardware removal,  neck exploration, and left fibula free flap on 6/21/22. Pt on full liquid diet  with KO tube for enteral feeds. Pt being followed by PT, recommend home PT and  RW. Plan for home tomorrow after removal of KO tube. CM met with pt at bedside  to discuss d/c plan. Pt able to understand English, but mainly Polish  speaking. Primary RN able to provide translation. Pt confirmed that she does  not have insurance and family to buy rolling walker. CM discussed [facility] services  (RN/PT), pt declined. Pt states her family will assist her at home as needed  and will assist with dressing changes to left fibula, if needed. Primary RN to  provide pt with supplies on day of discharge. Family to provide transportation  home on day of discharge. No CM interventions required.    SCREENING 201  Social Work Screen and Referral    Answers: None       Electronically signed by:  Autumn Bonilla RN [facility]  Electronically signed on:  2022-06-27  15:48   STATUS POST:  Mandibulectomy, reconstruction of the mandible with transosteal bone implant, Right neck dissection and a left lower leg free flap.     POST OPERATIVE DAY #7    SUBJECTIVE: No acute events overnight. Patient seen at admitting floor bed. Tolerating PO full liquid diet. Pain is well-controlled. Ambulating.     INTERVAL EVENTS  6/22: NGT removed yesterday. [place] removed. No issues overnight.  6/23: removed NGT again yesterday. naeon   6/24: No issues overnight. Tolerating NGT feeds.  6/25: NAEON, Step down from SICU to surgical floor. Tolerating bolus NGT feeds and tolerating PO clear liquids. Pain is well-controlled. Ambulating.   6/26: NAEON, Tolerating NGT feeds and Clear liquid diet. Pain well controlled. Ambulating.   6/27: NAEON.  Tolerating Full liquid diet. Left leg LEVY 49 output.  6/28: NAEON. LEVY. D/cd today.    ICU Vital Signs Last 24 Hrs  T(C): 36.4 (28 Jun 2022 06:00), Max: 36.9 (27 Jun 2022 09:23)  T(F): 97.6 (28 Jun 2022 06:00), Max: 98.5 (27 Jun 2022 09:23)  HR: 85 (28 Jun 2022 06:00) (85 - 103)  BP: 112/75 (28 Jun 2022 06:00) (107/71 - 122/89)  RR: 18 (28 Jun 2022 06:00) (17 - 18)  SpO2: 98% (28 Jun 2022 06:00) (98% - 100%)    Physical exam:  Gen: AAOx3, NAD, Communicative  Nose: NGT dc/d  Neck: Incision c/d/i.  Neck soft/flat. No erythema or focal swelling. Intra-oral flap warm pink and well perfused.  Hemostatic. LEVY removed.   Ext: L FFF donor site Ace wrapped. LEVY Removed    As per coordination with Physical Therapy, recommendation made for Home PT with rolling walker. Patient discussed with Case management regarding discharge planning, patient with no medical insurance, declines home care services. Patient will purchase rolling walker on her own. If patient requires dressing changes at home, patient's family to assist with dressing changes. Patient will be provided with dressing materials prior to discharge from hospital.

## 2022-06-28 NOTE — PROGRESS NOTE ADULT - ASSESSMENT
[id number]    42F PMH ameloblastoma s/p repair c/b intraoral dehiscence now s/p R mandibulectomy, hardware removal, neck exploration, L FFF 6/21.     - ERAS protocol  - Tolerating FLD  - Ambulating as tolerated  - Appreciate updated PT and Case management recs   - Left LEVY dcd  - Discharge today.     OMFS [id number]

## 2022-06-28 NOTE — PROGRESS NOTE ADULT - SUBJECTIVE AND OBJECTIVE BOX
STATUS POST:  Mandibulectomy, reconstruction of the mandible with transosteal bone implant, Right neck dissection and a left lower leg free flap.     POST OPERATIVE DAY #7    SUBJECTIVE: No acute events overnight. Patient seen at admitting floor bed. Tolerating PO full liquid diet. Pain is well-controlled. Ambulating.       INTERVAL EVENTS  6/22: NGT removed yesterday. [place] removed. No issues overnight.  6/23: removed NGT again yesterday. naeon   6/24: No issues overnight. Tolerating NGT feeds.  6/25: NAEON, Step down from SICU to surgical floor. Tolerating bolus NGT feeds and tolerating PO clear liquids. Pain is well-controlled. Ambulating.   6/26: NAEON, Tolerating NGT feeds and Clear liquid diet. Pain well controlled. Ambulating.   6/27: NAEON.  Tolerating Full liquid diet. Left leg LEVY 49 output.  6/28: NAEON. LEVY. D/cd today.    ICU Vital Signs Last 24 Hrs  T(C): 36.4 (28 Jun 2022 06:00), Max: 36.9 (27 Jun 2022 09:23)  T(F): 97.6 (28 Jun 2022 06:00), Max: 98.5 (27 Jun 2022 09:23)  HR: 85 (28 Jun 2022 06:00) (85 - 103)  BP: 112/75 (28 Jun 2022 06:00) (107/71 - 122/89)  BP(mean): --  ABP: --  ABP(mean): --  RR: 18 (28 Jun 2022 06:00) (17 - 18)  SpO2: 98% (28 Jun 2022 06:00) (98% - 100%)    Physical exam:  Gen: AAOx3, NAD, Communicative  Nose: NGT dc/d  Neck: Incision c/d/i.  Neck soft/flat. No erythema or focal swelling. Intra-oral flap warm pink and well perfused.  Hemostatic. LEVY removed.   Ext: L FFF donor site Ace wrapped. LEVY Removed

## 2022-06-28 NOTE — DISCHARGE NOTE PROVIDER - NSDCFUADDINST_GEN_ALL_CORE_FT
No dressing needed at Left leg incision. Leave Dermabond Strip in place until outpatient follow up with Dr. Garcia. Can wear ace wrap if helps with walking.

## 2022-06-28 NOTE — DISCHARGE NOTE PROVIDER - NSDCCPCAREPLAN_GEN_ALL_CORE_FT
PRINCIPAL DISCHARGE DIAGNOSIS  Diagnosis: S/P hardware removal  Assessment and Plan of Treatment:

## 2022-06-28 NOTE — DISCHARGE NOTE NURSING/CASE MANAGEMENT/SOCIAL WORK - NSDCPEFALRISK_GEN_ALL_CORE
For information on Fall & Injury Prevention, visit: [email or web address] OR  [email or web address] OR  https://www.cdc.gov/steadi/patient.html

## 2022-07-14 ENCOUNTER — APPOINTMENT (OUTPATIENT)
Dept: OTOLARYNGOLOGY | Facility: CLINIC | Age: 42
End: 2022-07-14

## 2022-07-14 VITALS
BODY MASS INDEX: 31.25 KG/M2 | HEART RATE: 60 BPM | SYSTOLIC BLOOD PRESSURE: 122 MMHG | WEIGHT: 159.17 LBS | DIASTOLIC BLOOD PRESSURE: 83 MMHG | TEMPERATURE: 98.6 F | HEIGHT: 60 IN

## 2022-07-14 PROBLEM — Z78.9 NO PERTINENT PAST MEDICAL HISTORY: Status: RESOLVED | Noted: 2022-07-14 | Resolved: 2022-07-14

## 2022-07-14 NOTE — REASON FOR VISIT
[Post-Operative Visit] : a post-operative visit [FreeTextEntry2] : s/p Right mandible reconstruction with a left fibular microvascular flap.   Vestibuloplasty of inferior arch.  Placement of mandible reconstruction titanium plate. Osteotomy of the fibula bone 6/21/22 [Interpreters_IDNumber] : [id number] [Interpreters_FullName] : dav [TWNoteComboBox1] : Malian

## 2022-07-14 NOTE — REASON FOR VISIT
[Post-Operative Visit] : a post-operative visit [FreeTextEntry2] : s/p Right mandible reconstruction with a left fibular microvascular flap.   Vestibuloplasty of inferior arch.  Placement of mandible reconstruction titanium plate. Osteotomy of the fibula bone 6/21/22 [Interpreters_IDNumber] : [id number] [Interpreters_FullName] : dav [TWNoteComboBox1] : Israeli

## 2022-07-14 NOTE — PHYSICAL EXAM
[Midline] : trachea located in midline position [de-identified] : Flap healing well. No signs of infection. [Normal] : no rashes

## 2022-07-14 NOTE — PHYSICAL EXAM
[Midline] : trachea located in midline position [de-identified] : Flap healing well. No signs of infection. [Normal] : no rashes

## 2022-08-20 ENCOUNTER — RESULT REVIEW (OUTPATIENT)
Age: 42
End: 2022-08-20

## 2022-08-20 ENCOUNTER — OUTPATIENT (OUTPATIENT)
Dept: OUTPATIENT SERVICES | Facility: HOSPITAL | Age: 42
LOS: 1 days | End: 2022-08-20
Payer: COMMERCIAL

## 2022-08-20 ENCOUNTER — APPOINTMENT (OUTPATIENT)
Dept: CT IMAGING | Facility: CLINIC | Age: 42
End: 2022-08-20

## 2022-08-20 DIAGNOSIS — Z00.8 ENCOUNTER FOR OTHER GENERAL EXAMINATION: ICD-10-CM

## 2022-08-20 DIAGNOSIS — D16.5 BENIGN NEOPLASM OF LOWER JAW BONE: ICD-10-CM

## 2022-08-20 PROCEDURE — 70487 CT MAXILLOFACIAL W/DYE: CPT | Mod: 26

## 2022-08-20 PROCEDURE — 70487 CT MAXILLOFACIAL W/DYE: CPT

## 2022-10-13 ENCOUNTER — APPOINTMENT (OUTPATIENT)
Dept: OTOLARYNGOLOGY | Facility: CLINIC | Age: 42
End: 2022-10-13

## 2022-10-13 VITALS
HEIGHT: 60 IN | WEIGHT: 159 LBS | DIASTOLIC BLOOD PRESSURE: 86 MMHG | BODY MASS INDEX: 31.22 KG/M2 | SYSTOLIC BLOOD PRESSURE: 124 MMHG | HEART RATE: 66 BPM

## 2022-10-13 DIAGNOSIS — D16.5 BENIGN NEOPLASM OF LOWER JAW BONE: ICD-10-CM

## 2022-10-13 PROCEDURE — 99214 OFFICE O/P EST MOD 30 MIN: CPT

## 2022-10-13 NOTE — PHYSICAL EXAM
[Midline] : trachea located in midline position [Normal] : no rashes [de-identified] : Flap in place, completely healed. No signs of infection.

## 2022-10-13 NOTE — CONSULT LETTER
[Dear  ___] : Dear  [unfilled], [Courtesy Letter:] : I had the pleasure of seeing your patient, [unfilled], in my office today. [Please see my note below.] : Please see my note below. [Consult Closing:] : Thank you very much for allowing me to participate in the care of this patient.  If you have any questions, please do not hesitate to contact me. [Sincerely,] : Sincerely, [FreeTextEntry2] : Dr Osvaldo Bradshaw [FreeTextEntry3] : \par Bruce Garcia MD, FACS\par \par Otolaryngology-Head and Neck Surgery\par [employer] [facility]\par

## 2022-10-13 NOTE — HISTORY OF PRESENT ILLNESS
[de-identified] : 42 year old female presents for follow up s/p Right mandible reconstruction with a left fibular microvascular flap. Vestibuloplasty of inferior arch. Placement of mandible reconstruction titanium plate. Osteotomy of the fibula bone 6/21/22. States feeling good, denies swelling states it has improved, able to open mouth with out any pain and currently eating soft and solid foods tolerating well. Denies recent fevers, dysphagia, odynophagia, night sweats, chills, bleeding, or drainage from incision sites. \par \par CT Maxillofacial 08/20/22 IMPRESSION: Interval right mandibular fibular free flap reconstruction. 5.0 cm collection just inferior to the surgical site which could represent postsurgical seroma or abscess. Surrounding fat stranding is noted, cannot rule out cellulitis. Correlate with clinical parameters.\par \par \par CT angio Lower extremity (NW) 4/11/2022:\par IMPRESSION:\par Three-vessel runoff in both lower extremities to the ankle. Both anterior and posterior tibial arteries are patent to the foot; the peroneal arteries are small and difficult to visualize in the feet.\par \par Focal 2.7 cm narrowing with wall thickening in the proximal transverse colon, concerning for possible colonic neoplasm. Correlate with any prior studies and colonoscopy.\par \par Pathology 6/21/22\par Final Diagnosis\par 1. "Right parotid tail", resection\par - Fibroadipose tissue\par - 3 benign lymph nodes\par 2. Lymph nodes, right level 1 and 2, neck dissection\par - 10 benign lymph nodes\par - Submandibular gland with mild chronic inflammation \par \par Complete review of systems which was performed during a previous encounter was reviewed with the patient and there are no changes except as stated in the HPI section.\par

## 2022-10-13 NOTE — REASON FOR VISIT
[Subsequent Evaluation] : a subsequent evaluation for [Other: ______] : provided by ANASTASIYA [Source: ______] : History obtained from [unfilled] [FreeTextEntry2] : s/p Right mandible reconstruction with a left fibular microvascular flap. Vestibuloplasty of inferior arch. Placement of mandible reconstruction titanium plate. Osteotomy of the fibula bone 6/21/22.  [Interpreters_IDNumber] : [id number] [Interpreters_FullName] : Ebenezer  [TWNoteComboBox1] : Nepalese

## 2023-08-08 ENCOUNTER — OUTPATIENT (OUTPATIENT)
Dept: OUTPATIENT SERVICES | Facility: HOSPITAL | Age: 43
LOS: 1 days | End: 2023-08-08

## 2023-08-08 VITALS
TEMPERATURE: 97 F | DIASTOLIC BLOOD PRESSURE: 66 MMHG | HEART RATE: 62 BPM | OXYGEN SATURATION: 98 % | RESPIRATION RATE: 18 BRPM | HEIGHT: 59.25 IN | SYSTOLIC BLOOD PRESSURE: 138 MMHG | WEIGHT: 171.08 LBS

## 2023-08-08 DIAGNOSIS — D16.5 BENIGN NEOPLASM OF LOWER JAW BONE: ICD-10-CM

## 2023-08-08 DIAGNOSIS — Z78.9 OTHER SPECIFIED HEALTH STATUS: ICD-10-CM

## 2023-08-08 DIAGNOSIS — Z98.890 OTHER SPECIFIED POSTPROCEDURAL STATES: Chronic | ICD-10-CM

## 2023-08-08 LAB
ANION GAP SERPL CALC-SCNC: 12 MMOL/L — SIGNIFICANT CHANGE UP (ref 7–14)
BUN SERPL-MCNC: 6 MG/DL — LOW (ref 7–23)
CALCIUM SERPL-MCNC: 8.9 MG/DL — SIGNIFICANT CHANGE UP (ref 8.4–10.5)
CHLORIDE SERPL-SCNC: 101 MMOL/L — SIGNIFICANT CHANGE UP (ref 98–107)
CO2 SERPL-SCNC: 25 MMOL/L — SIGNIFICANT CHANGE UP (ref 22–31)
CREAT SERPL-MCNC: 0.62 MG/DL — SIGNIFICANT CHANGE UP (ref 0.5–1.3)
EGFR: 112 ML/MIN/1.73M2 — SIGNIFICANT CHANGE UP
GLUCOSE SERPL-MCNC: 82 MG/DL — SIGNIFICANT CHANGE UP (ref 70–99)
HCT VFR BLD CALC: 38.7 % — SIGNIFICANT CHANGE UP (ref 34.5–45)
HGB BLD-MCNC: 13.2 G/DL — SIGNIFICANT CHANGE UP (ref 11.5–15.5)
MCHC RBC-ENTMCNC: 30.6 PG — SIGNIFICANT CHANGE UP (ref 27–34)
MCHC RBC-ENTMCNC: 34.1 GM/DL — SIGNIFICANT CHANGE UP (ref 32–36)
MCV RBC AUTO: 89.6 FL — SIGNIFICANT CHANGE UP (ref 80–100)
NRBC # BLD: 0 /100 WBCS — SIGNIFICANT CHANGE UP (ref 0–0)
NRBC # FLD: 0 K/UL — SIGNIFICANT CHANGE UP (ref 0–0)
PLATELET # BLD AUTO: 206 K/UL — SIGNIFICANT CHANGE UP (ref 150–400)
POTASSIUM SERPL-MCNC: 3.8 MMOL/L — SIGNIFICANT CHANGE UP (ref 3.5–5.3)
POTASSIUM SERPL-SCNC: 3.8 MMOL/L — SIGNIFICANT CHANGE UP (ref 3.5–5.3)
RBC # BLD: 4.32 M/UL — SIGNIFICANT CHANGE UP (ref 3.8–5.2)
RBC # FLD: 12.1 % — SIGNIFICANT CHANGE UP (ref 10.3–14.5)
SODIUM SERPL-SCNC: 138 MMOL/L — SIGNIFICANT CHANGE UP (ref 135–145)
WBC # BLD: 8.42 K/UL — SIGNIFICANT CHANGE UP (ref 3.8–10.5)
WBC # FLD AUTO: 8.42 K/UL — SIGNIFICANT CHANGE UP (ref 3.8–10.5)

## 2023-08-08 RX ORDER — SODIUM CHLORIDE 9 MG/ML
1000 INJECTION, SOLUTION INTRAVENOUS
Refills: 0 | Status: DISCONTINUED | OUTPATIENT
Start: 2023-08-15 | End: 2023-08-29

## 2023-08-08 NOTE — H&P PST ADULT - PROBLEM SELECTOR PLAN 1
Schedule for implant placement teeth # 24, 27 tentatively on 08/15/23. Pre op instructions, Famotidine given and explained. Pt instructed to bring urine specimen on day of surgery, urine cup given to pt who verbalized understanding.

## 2023-08-08 NOTE — H&P PST ADULT - NS PRO LACT YNNA
Occasionally these wires can fracture, although normally this presents no concern as the broken wire remains fixed and is not problematic unless pt becomes symptomatic from it.    no

## 2023-08-08 NOTE — H&P PST ADULT - HISTORY OF PRESENT ILLNESS
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 Now schedule for implant placement teeth # 24,27 tentatively on 08/15/23

## 2023-08-09 LAB — HCG SERPL-ACNC: <1 MIU/ML — SIGNIFICANT CHANGE UP

## 2023-08-14 ENCOUNTER — TRANSCRIPTION ENCOUNTER (OUTPATIENT)
Age: 43
End: 2023-08-14

## 2023-08-15 ENCOUNTER — TRANSCRIPTION ENCOUNTER (OUTPATIENT)
Age: 43
End: 2023-08-15

## 2023-08-15 ENCOUNTER — OUTPATIENT (OUTPATIENT)
Dept: OUTPATIENT SERVICES | Facility: HOSPITAL | Age: 43
LOS: 1 days | Discharge: HOME | End: 2023-08-15

## 2023-08-15 VITALS
WEIGHT: 169.98 LBS | HEIGHT: 61.02 IN | OXYGEN SATURATION: 100 % | TEMPERATURE: 98 F | HEART RATE: 59 BPM | SYSTOLIC BLOOD PRESSURE: 120 MMHG | RESPIRATION RATE: 16 BRPM | DIASTOLIC BLOOD PRESSURE: 73 MMHG

## 2023-08-15 VITALS — RESPIRATION RATE: 15 BRPM | TEMPERATURE: 98 F | OXYGEN SATURATION: 97 % | HEART RATE: 88 BPM

## 2023-08-15 DIAGNOSIS — Z98.890 OTHER SPECIFIED POSTPROCEDURAL STATES: Chronic | ICD-10-CM

## 2023-08-15 DIAGNOSIS — D16.5 BENIGN NEOPLASM OF LOWER JAW BONE: ICD-10-CM

## 2023-08-15 LAB — HCG UR QL: NEGATIVE — SIGNIFICANT CHANGE UP

## 2023-08-15 DEVICE — IMP NOBELACTIVE PLATEFORM RP 4.3X10MM: Type: IMPLANTABLE DEVICE | Status: FUNCTIONAL

## 2023-08-15 DEVICE — IMPLANTABLE DEVICE: Type: IMPLANTABLE DEVICE | Status: FUNCTIONAL

## 2023-08-15 RX ORDER — CHLORHEXIDINE GLUCONATE 213 G/1000ML
15 SOLUTION TOPICAL
Qty: 1 | Refills: 0
Start: 2023-08-15 | End: 2023-08-21

## 2023-08-15 RX ORDER — HYDROMORPHONE HYDROCHLORIDE 2 MG/ML
0.5 INJECTION INTRAMUSCULAR; INTRAVENOUS; SUBCUTANEOUS
Refills: 0 | Status: DISCONTINUED | OUTPATIENT
Start: 2023-08-15 | End: 2023-08-15

## 2023-08-15 RX ORDER — AMOXICILLIN 250 MG/5ML
1 SUSPENSION, RECONSTITUTED, ORAL (ML) ORAL
Qty: 21 | Refills: 0
Start: 2023-08-15 | End: 2023-08-21

## 2023-08-15 RX ORDER — ACETAMINOPHEN 500 MG
1 TABLET ORAL
Qty: 15 | Refills: 0
Start: 2023-08-15 | End: 2023-08-19

## 2023-08-15 RX ORDER — FENTANYL CITRATE 50 UG/ML
25 INJECTION INTRAVENOUS
Refills: 0 | Status: DISCONTINUED | OUTPATIENT
Start: 2023-08-15 | End: 2023-08-15

## 2023-08-15 RX ORDER — ONDANSETRON 8 MG/1
4 TABLET, FILM COATED ORAL ONCE
Refills: 0 | Status: DISCONTINUED | OUTPATIENT
Start: 2023-08-15 | End: 2023-08-29

## 2023-08-15 RX ORDER — IBUPROFEN 200 MG
1 TABLET ORAL
Qty: 28 | Refills: 0
Start: 2023-08-15 | End: 2023-08-21

## 2023-08-15 RX ADMIN — FENTANYL CITRATE 25 MICROGRAM(S): 50 INJECTION INTRAVENOUS at 20:25

## 2023-08-15 NOTE — ASU DISCHARGE PLAN (ADULT/PEDIATRIC) - CARE PROVIDER_API CALL
Osvaldo Bradshaw  Oral/Maxillofacial Surgery  [address] [address] [place] [id number]  Phone: [phone]  Fax: [phone]  Established Patient  Follow Up Time: 1 week

## 2023-08-15 NOTE — ASU DISCHARGE PLAN (ADULT/PEDIATRIC) - NURSING INSTRUCTIONS
DO NOT take any Tylenol (Acetaminophen) or narcotics containing Tylenol until after 11:30 pm . You received Tylenol during your operation and it can cause damage to your liver if too much is taken within a 24 hour time period.

## 2023-08-15 NOTE — BRIEF OPERATIVE NOTE - OPERATION/FINDINGS
Right Fibula free flap debulking, vestibuloplasty, placement of 3x dental implants (Edgar Active 4.3 x 10mm)

## 2023-09-06 ENCOUNTER — APPOINTMENT (OUTPATIENT)
Age: 43
End: 2023-09-06
Payer: MEDICAID

## 2023-09-06 PROCEDURE — 99024 POSTOP FOLLOW-UP VISIT: CPT

## 2023-09-26 ENCOUNTER — APPOINTMENT (OUTPATIENT)
Age: 43
End: 2023-09-26
Payer: MEDICAID

## 2023-09-26 PROCEDURE — D0470 DIAGNOSTIC CASTS: CPT

## 2023-10-17 ENCOUNTER — APPOINTMENT (OUTPATIENT)
Age: 43
End: 2023-10-17
Payer: SELF-PAY

## 2023-10-17 PROCEDURE — NTX: CUSTOM

## 2023-10-31 ENCOUNTER — APPOINTMENT (OUTPATIENT)
Age: 43
End: 2023-10-31
Payer: MEDICAID

## 2023-10-31 PROCEDURE — 99212 OFFICE O/P EST SF 10 MIN: CPT

## 2023-11-14 ENCOUNTER — APPOINTMENT (OUTPATIENT)
Age: 43
End: 2023-11-14
Payer: MEDICAID

## 2023-11-14 PROCEDURE — ZZZZZ: CPT | Mod: 1L

## 2023-12-13 ENCOUNTER — APPOINTMENT (OUTPATIENT)
Age: 43
End: 2023-12-13
Payer: MEDICAID

## 2023-12-13 PROCEDURE — ZZZZZ: CPT

## 2023-12-13 PROCEDURE — 99212 OFFICE O/P EST SF 10 MIN: CPT

## 2024-02-20 ENCOUNTER — APPOINTMENT (OUTPATIENT)
Age: 44
End: 2024-02-20
Payer: SELF-PAY

## 2024-02-20 PROCEDURE — D6199: CPT

## 2024-03-12 ENCOUNTER — APPOINTMENT (OUTPATIENT)
Age: 44
End: 2024-03-12

## 2024-03-12 PROCEDURE — 99213 OFFICE O/P EST LOW 20 MIN: CPT

## 2024-03-26 ENCOUNTER — APPOINTMENT (OUTPATIENT)
Age: 44
End: 2024-03-26
Payer: SELF-PAY

## 2024-03-26 PROCEDURE — PIP: CUSTOM

## 2024-04-03 ENCOUNTER — APPOINTMENT (OUTPATIENT)
Age: 44
End: 2024-04-03

## 2024-04-16 ENCOUNTER — APPOINTMENT (OUTPATIENT)
Age: 44
End: 2024-04-16

## 2024-04-16 PROCEDURE — D6199: CPT

## 2024-05-07 ENCOUNTER — APPOINTMENT (OUTPATIENT)
Age: 44
End: 2024-05-07
Payer: SELF-PAY

## 2024-05-07 PROCEDURE — D6793: CPT

## 2024-05-07 PROCEDURE — D6253: CPT

## 2024-06-11 ENCOUNTER — APPOINTMENT (OUTPATIENT)
Age: 44
End: 2024-06-11
Payer: SELF-PAY

## 2024-06-11 PROCEDURE — PIP: CUSTOM

## 2024-07-02 ENCOUNTER — APPOINTMENT (OUTPATIENT)
Age: 44
End: 2024-07-02
Payer: SELF-PAY

## 2024-07-02 PROCEDURE — PIP: CUSTOM

## 2024-07-30 ENCOUNTER — APPOINTMENT (OUTPATIENT)
Age: 44
End: 2024-07-30
Payer: SELF-PAY

## 2024-07-30 PROCEDURE — PIP: CUSTOM

## 2024-08-20 ENCOUNTER — APPOINTMENT (OUTPATIENT)
Age: 44
End: 2024-08-20
Payer: SELF-PAY

## 2024-08-20 PROCEDURE — D6245 PONTIC - PORCELAIN/CERAMIC: CPT

## 2024-08-20 PROCEDURE — D6056: CPT

## 2024-08-20 PROCEDURE — D6075: CPT

## 2024-09-03 ENCOUNTER — APPOINTMENT (OUTPATIENT)
Age: 44
End: 2024-09-03
Payer: SELF-PAY

## 2024-09-03 PROCEDURE — 99024 POSTOP FOLLOW-UP VISIT: CPT

## 2024-11-01 NOTE — DISCHARGE NOTE PROVIDER - YES NO FOR MLM POSITIVE OR NEGATIVE COVID RESULT
11/01/24 1539   Adult IBW/VT Calculations   Height 5' 3\" (1.6 m)   IBW/kg (Calculated) Male 56.9   Low Range Vt 6cc/kg MALE 341.4   Adult Moderate Range Vt 8cc/kg .2   Adult High Range Vt 10cc/kg MALE 569   IBW/kg (Calculated) FEMALE 52.4   Low Range Vt 6cc/kg FEMALE 314.4   Adult Moderate Range vt 8cc/kg FEMALE 419.2   Protocol Acuity   Vital Capacity (Actual) (L) 1.84 L   Vital Capacity ( % Calculated)  80 %   FEV1 (Actual) (L) 1.4 L   FEV1 (% Calculated) 93 %   FEV1/FVC% 76 %   Pulmonary Status 0   Surgical Status 0   Respiratory Pattern 0   Breath Sounds 0   Cough 0   Secretions 1   CXR 0   Activity Level 1   Mental Status 0   Vital Capacity % Predicted 1   FEV1/FVC% (Actual) 0   FiO2 Required 0   Home Resp Meds (Nebs or Inhalers) 0   Home Oxygen No   Hx of, or potential for, SKYLA 0   Acuity Score   Acuity Score 3   Acuity Frequency None   $ RT Protocol Assessment Subsequent assessment   $ Vital Capacity Procedure Completed   Pathway Recommendations   Volume Expansion Yes   Recommendation Comment IS at bedside     Respiratory Care Protocol Assessment    Respiratory assessment completed, pathway(s) are indicated per the following triggers:        Volume Expansion: (P) Yes                     Recommendations: Will order Incentive Spirometry per  Acuity Score: (P) 3   they will be scheduled   Acuity Frequency: (P) None.    Additional comments: Recommendation Comment: (P) IS at bedside    Goal: Return to current home regimen        ,

## 2024-12-29 ENCOUNTER — EMERGENCY (EMERGENCY)
Facility: HOSPITAL | Age: 44
LOS: 1 days | Discharge: HOME | End: 2024-12-29
Attending: EMERGENCY MEDICINE | Admitting: EMERGENCY MEDICINE
Payer: SELF-PAY

## 2024-12-29 VITALS
OXYGEN SATURATION: 97 % | RESPIRATION RATE: 18 BRPM | DIASTOLIC BLOOD PRESSURE: 80 MMHG | TEMPERATURE: 98 F | HEART RATE: 96 BPM | SYSTOLIC BLOOD PRESSURE: 119 MMHG | WEIGHT: 169.98 LBS

## 2024-12-29 DIAGNOSIS — Z98.890 OTHER SPECIFIED POSTPROCEDURAL STATES: Chronic | ICD-10-CM

## 2024-12-29 LAB
ADD ON TEST-SPECIMEN IN LAB: SIGNIFICANT CHANGE UP
ALBUMIN SERPL ELPH-MCNC: 4.4 G/DL — SIGNIFICANT CHANGE UP (ref 3.3–5)
ALP SERPL-CCNC: 90 U/L — SIGNIFICANT CHANGE UP (ref 40–120)
ALT FLD-CCNC: 18 U/L — SIGNIFICANT CHANGE UP (ref 4–33)
ANION GAP SERPL CALC-SCNC: 11 MMOL/L — SIGNIFICANT CHANGE UP (ref 7–14)
APTT BLD: 31.2 SEC — SIGNIFICANT CHANGE UP (ref 24.5–35.6)
AST SERPL-CCNC: 17 U/L — SIGNIFICANT CHANGE UP (ref 4–32)
BASOPHILS # BLD AUTO: 0.03 K/UL — SIGNIFICANT CHANGE UP (ref 0–0.2)
BASOPHILS NFR BLD AUTO: 0.3 % — SIGNIFICANT CHANGE UP (ref 0–2)
BILIRUB SERPL-MCNC: 0.4 MG/DL — SIGNIFICANT CHANGE UP (ref 0.2–1.2)
BUN SERPL-MCNC: 6 MG/DL — LOW (ref 7–23)
CALCIUM SERPL-MCNC: 9.2 MG/DL — SIGNIFICANT CHANGE UP (ref 8.4–10.5)
CHLORIDE SERPL-SCNC: 102 MMOL/L — SIGNIFICANT CHANGE UP (ref 98–107)
CO2 SERPL-SCNC: 26 MMOL/L — SIGNIFICANT CHANGE UP (ref 22–31)
CREAT SERPL-MCNC: 0.58 MG/DL — SIGNIFICANT CHANGE UP (ref 0.5–1.3)
EGFR: 113 ML/MIN/1.73M2 — SIGNIFICANT CHANGE UP
EOSINOPHIL # BLD AUTO: 0.25 K/UL — SIGNIFICANT CHANGE UP (ref 0–0.5)
EOSINOPHIL NFR BLD AUTO: 2.6 % — SIGNIFICANT CHANGE UP (ref 0–6)
FLUAV AG NPH QL: SIGNIFICANT CHANGE UP
FLUBV AG NPH QL: SIGNIFICANT CHANGE UP
GLUCOSE SERPL-MCNC: 87 MG/DL — SIGNIFICANT CHANGE UP (ref 70–99)
HCG SERPL-ACNC: <1 MIU/ML — SIGNIFICANT CHANGE UP
HCT VFR BLD CALC: 39.7 % — SIGNIFICANT CHANGE UP (ref 34.5–45)
HGB BLD-MCNC: 13.6 G/DL — SIGNIFICANT CHANGE UP (ref 11.5–15.5)
IANC: 6.23 K/UL — SIGNIFICANT CHANGE UP (ref 1.8–7.4)
IMM GRANULOCYTES NFR BLD AUTO: 0.3 % — SIGNIFICANT CHANGE UP (ref 0–0.9)
INR BLD: 1.06 RATIO — SIGNIFICANT CHANGE UP (ref 0.85–1.16)
LACTATE BLDV-MCNC: 1 MMOL/L — SIGNIFICANT CHANGE UP (ref 0.5–2)
LYMPHOCYTES # BLD AUTO: 2.29 K/UL — SIGNIFICANT CHANGE UP (ref 1–3.3)
LYMPHOCYTES # BLD AUTO: 24 % — SIGNIFICANT CHANGE UP (ref 13–44)
MCHC RBC-ENTMCNC: 31.1 PG — SIGNIFICANT CHANGE UP (ref 27–34)
MCHC RBC-ENTMCNC: 34.3 G/DL — SIGNIFICANT CHANGE UP (ref 32–36)
MCV RBC AUTO: 90.6 FL — SIGNIFICANT CHANGE UP (ref 80–100)
MONOCYTES # BLD AUTO: 0.7 K/UL — SIGNIFICANT CHANGE UP (ref 0–0.9)
MONOCYTES NFR BLD AUTO: 7.3 % — SIGNIFICANT CHANGE UP (ref 2–14)
NEUTROPHILS # BLD AUTO: 6.23 K/UL — SIGNIFICANT CHANGE UP (ref 1.8–7.4)
NEUTROPHILS NFR BLD AUTO: 65.5 % — SIGNIFICANT CHANGE UP (ref 43–77)
NRBC # BLD: 0 /100 WBCS — SIGNIFICANT CHANGE UP (ref 0–0)
NRBC # FLD: 0 K/UL — SIGNIFICANT CHANGE UP (ref 0–0)
PLATELET # BLD AUTO: 223 K/UL — SIGNIFICANT CHANGE UP (ref 150–400)
POTASSIUM SERPL-MCNC: 3.6 MMOL/L — SIGNIFICANT CHANGE UP (ref 3.5–5.3)
POTASSIUM SERPL-SCNC: 3.6 MMOL/L — SIGNIFICANT CHANGE UP (ref 3.5–5.3)
PROT SERPL-MCNC: 8.3 G/DL — SIGNIFICANT CHANGE UP (ref 6–8.3)
PROTHROM AB SERPL-ACNC: 12.6 SEC — SIGNIFICANT CHANGE UP (ref 9.9–13.4)
RBC # BLD: 4.38 M/UL — SIGNIFICANT CHANGE UP (ref 3.8–5.2)
RBC # FLD: 11.6 % — SIGNIFICANT CHANGE UP (ref 10.3–14.5)
RSV RNA NPH QL NAA+NON-PROBE: SIGNIFICANT CHANGE UP
SARS-COV-2 RNA SPEC QL NAA+PROBE: SIGNIFICANT CHANGE UP
SODIUM SERPL-SCNC: 139 MMOL/L — SIGNIFICANT CHANGE UP (ref 135–145)
WBC # BLD: 9.53 K/UL — SIGNIFICANT CHANGE UP (ref 3.8–10.5)
WBC # FLD AUTO: 9.53 K/UL — SIGNIFICANT CHANGE UP (ref 3.8–10.5)

## 2024-12-29 PROCEDURE — 70491 CT SOFT TISSUE NECK W/DYE: CPT | Mod: 26,MC

## 2024-12-29 PROCEDURE — 99223 1ST HOSP IP/OBS HIGH 75: CPT

## 2024-12-29 PROCEDURE — 71045 X-RAY EXAM CHEST 1 VIEW: CPT | Mod: 26

## 2024-12-29 PROCEDURE — 93010 ELECTROCARDIOGRAM REPORT: CPT

## 2024-12-29 RX ORDER — SODIUM CHLORIDE 9 MG/ML
1000 INJECTION, SOLUTION INTRAMUSCULAR; INTRAVENOUS; SUBCUTANEOUS
Refills: 0 | Status: ACTIVE | OUTPATIENT
Start: 2024-12-29 | End: 2025-11-27

## 2024-12-29 RX ORDER — AMPICILLIN AND SULBACTAM 1; .5 G/1; G/1
3 INJECTION, POWDER, FOR SOLUTION INTRAVENOUS EVERY 6 HOURS
Refills: 0 | Status: ACTIVE | OUTPATIENT
Start: 2024-12-29 | End: 2025-11-27

## 2024-12-29 RX ORDER — KETOROLAC TROMETHAMINE 30 MG/ML
15 INJECTION INTRAMUSCULAR; INTRAVENOUS EVERY 6 HOURS
Refills: 0 | Status: DISCONTINUED | OUTPATIENT
Start: 2024-12-29 | End: 2024-12-29

## 2024-12-29 RX ORDER — SODIUM CHLORIDE 9 MG/ML
1000 INJECTION, SOLUTION INTRAMUSCULAR; INTRAVENOUS; SUBCUTANEOUS ONCE
Refills: 0 | Status: COMPLETED | OUTPATIENT
Start: 2024-12-29 | End: 2024-12-29

## 2024-12-29 RX ORDER — AMPICILLIN AND SULBACTAM 1; .5 G/1; G/1
3 INJECTION, POWDER, FOR SOLUTION INTRAVENOUS ONCE
Refills: 0 | Status: COMPLETED | OUTPATIENT
Start: 2024-12-29 | End: 2024-12-29

## 2024-12-29 RX ADMIN — SODIUM CHLORIDE 125 MILLILITER(S): 9 INJECTION, SOLUTION INTRAMUSCULAR; INTRAVENOUS; SUBCUTANEOUS at 22:44

## 2024-12-29 RX ADMIN — AMPICILLIN AND SULBACTAM 200 GRAM(S): 1; .5 INJECTION, POWDER, FOR SOLUTION INTRAVENOUS at 22:45

## 2024-12-29 RX ADMIN — AMPICILLIN AND SULBACTAM 200 GRAM(S): 1; .5 INJECTION, POWDER, FOR SOLUTION INTRAVENOUS at 14:39

## 2024-12-29 RX ADMIN — KETOROLAC TROMETHAMINE 15 MILLIGRAM(S): 30 INJECTION INTRAMUSCULAR; INTRAVENOUS at 23:45

## 2024-12-29 RX ADMIN — KETOROLAC TROMETHAMINE 15 MILLIGRAM(S): 30 INJECTION INTRAMUSCULAR; INTRAVENOUS at 23:10

## 2024-12-29 RX ADMIN — SODIUM CHLORIDE 1000 MILLILITER(S): 9 INJECTION, SOLUTION INTRAMUSCULAR; INTRAVENOUS; SUBCUTANEOUS at 14:39

## 2024-12-29 NOTE — ED ADULT NURSE NOTE - OBJECTIVE STATEMENT
Pt with phx. of ameloblastoma s/p repair, and R mandibulectomy, c/o difficulty swallowing liquids since this AM and facial swelling/pain. Denies any difficulty breathing. Respirations even and unlabored. No apparent distress noted at this time. Pt also has hx dental implants. of  Denies chest pain/SOB. 20g IV placed in AC, labs drawn as ordered. ENT currently at bedside. Pt awaiting transfer to room 1 in main ED when ready. Safety maintained.

## 2024-12-29 NOTE — ED ADULT NURSE REASSESSMENT NOTE - NSFALLUNIVINTERV_ED_ALL_ED
Bed/Stretcher in lowest position, wheels locked, appropriate side rails in place/Call bell, personal items and telephone in reach/Instruct patient to call for assistance before getting out of bed/chair/stretcher/Non-slip footwear applied when patient is off stretcher/[place] to call system/Physically safe environment - no spills, clutter or unnecessary equipment/Purposeful proactive rounding/Room/bathroom lighting operational, light cord in reach

## 2024-12-29 NOTE — ED PROVIDER NOTE - OBJECTIVE STATEMENT
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 ) 6/21. She presents today with facial swelling, pain around her jaw and neck, pain with swallowing, difficulty swallowing liquids since this morning. Last night felt warm. Yesterday normal state of health. no cp or sob. no cough. had oral implants in June 2024 with Dr. Osvaldo Bradshaw [place]

## 2024-12-29 NOTE — CONSULT NOTE ADULT - ASSESSMENT
A/P: 44y Female PMH significant for ameloblastoma s/p segmental mandibulectomy (7/2020) reconstruction with fibula free flap (2022), and implant placement (2023), s/p gingivectomy and placement of compression stent 3/12/24. Implant retained prosthesis loaded by Dr. Soto (8/20/24), presents with 2 days of mandibular swelling. Airway patent on laryngoscopy. CT scan w/ lucency around central right screw in parasymphyseal mandible and extensive subcutaneous fat stranding in submandibular tissue, consistent with infection.    - Continue IV abx  - Remainder of management per OMFS      --------------------------------------------------------------  Thank you for the consult,    Nazia Villela MD  Resident  Department of Otolaryngology - Head and Neck Surgery  Peds Page #[phone]  Adult Page #[phone]  ---------------------------------------------------------------

## 2024-12-29 NOTE — ED CDU PROVIDER INITIAL DAY NOTE - CLINICAL SUMMARY MEDICAL DECISION MAKING FREE TEXT BOX
42-year-old female past medical history of ameloblastoma status post repair presents to the ED complaining of intraoral dehiscence status post right mandibulectomy and hardware removal.  Patient states she noticed facial swelling and pain around her jaw and neck with difficulty swallowing liquids since this morning.  While in ED labs acutely unremarkable.  CT neck significant for  lucency around [employer], extensive subcutaneous fat stranding and inflammatory changes suggestive of infection.  OMFS seen and evaluated patient at bedside in which performed an incision and drainage and would like patient placed in CDU for IV antibiotics, remain n.p.o. and will reassess in the morning.  ID #: [id number] (Ena)

## 2024-12-29 NOTE — ED PROVIDER NOTE - PROGRESS NOTE DETAILS
ENT and OMFS called - will see pt in ED.  I saw pt in intake - will sign out patient to Red for further care given  concern for le. airway patent. Patient received on sign-out from Intake Attending for concern for airway watch.  ID [id number] (CYDNEYjudiannie)- 43 y/o F PMHx ameloblastoma s/p repair and prosthetics p/w lower right facial swelling x 1 day, progressively worsening and spreading across to left side. Tenderness to palpation of jaw. Gingival swelling/pus/erythema with some right submandibular induration noted but no tongue elevation. No swelling to angle of mandible. Uvula clear. Airway patent. OMFS spoken to by previous attending and case discussed personally by me as well at bedside. Empirically covered with Unasyn IV. Plan- F/u labs, F/u CXR/CT neck/maxillofacial, F/u OMFS recs, Reassess Patient received on sign-out from Intake Attending for concern for airway.  ID [id number] (CYDNEYjudiannie)- 41 y/o F PMHx ameloblastoma s/p repair and prosthetics p/w lower right facial swelling x 1 day, progressively worsening and spreading across to left side. Reports difficulty swallowing. Tenderness to palpation of jaw. Gingival swelling/pus/erythema with some right submandibular induration noted but no tongue elevation. No swelling to angle of mandible. Uvula clear. Airway patent. OMFS spoken to by previous attending and case discussed personally by me as well at bedside. Empirically covered with Unasyn IV. Plan- F/u labs, F/u CXR/CT neck/maxillofacial, F/u OMFS recs, Reassess Nazia Joiner MD, PGY3:  OMFS at bedside, s/p aspiration by OMFS. pt to go to CDU for abx and hardware removal tomorrow in OMFS clinic

## 2024-12-29 NOTE — ED PROVIDER NOTE - CLINICAL SUMMARY MEDICAL DECISION MAKING FREE TEXT BOX
2:30pm: Stable. Patient received on sign-out from Intake Attending for concern for airway.  ID [id number] (Winifred)- 41 y/o F PMHx ameloblastoma s/p repair and prosthetics p/w lower right facial swelling x 1 day, progressively worsening and spreading across to left side. Reports difficulty swallowing. Tenderness to palpation of jaw. Gingival swelling/pus/erythema with some right submandibular induration noted but no tongue elevation. No swelling to angle of mandible. Uvula clear. Airway patent. OMFS spoken to by previous attending and case discussed personally by me as well at bedside. Empirically covered with Unasyn IV. Plan- F/u labs, F/u CXR/CT neck/maxillofacial, F/u OMFS recs, Reassess.

## 2024-12-29 NOTE — CONSULT NOTE ADULT - SUBJECTIVE AND OBJECTIVE BOX
HPI:  Patient is a 44y Female with PMH significant for ameloblastoma s/p segmental mandibulectomy (7/2020) reconstruction with fibula free flap (2022), and implant placement (2023), s/p gingivectomy and placement of compression stent 3/12/24. Implant retained prosthesis loaded by Dr. Soto (8/20/24), presents with 2 days of mandibular swelling. She reports that starting Saturday she developed pain and swelling at the left submandibular space and then this morning it extended to the right mandibular space. She denies difficulty breathing. She denies pain with swallowing. She is tolerating her diet. No trismus.           Physical Exam  T(C): 37.2 (12-29-24 @ 21:57), Max: 37.2 (12-29-24 @ 21:34)  HR: 82 (12-29-24 @ 21:57) (55 - 96)  BP: 103/69 (12-29-24 @ 21:57) (103/69 - 120/83)  RR: 18 (12-29-24 @ 21:57) (18 - 18)  SpO2: 99% (12-29-24 @ 21:57) (97% - 99%)    General: NAD, A+Ox3  Resp: No respiratory distress, stridor, or stertor on room air  Voice quality: normal  Face:  Symmetric without masses or lesions  OU: EOMI  Right: Pinna wnl, EAC clear, TM intact, no effusion  Left: Pinna wnl, EAC clear, TM intact, no effusion  Nose: nasal cavity clear bilaterally, inferior turbinates normal, mucosa normal without crusting or bleeding  OC/OP: tongue normal, floor of mouth wnl, no masses or lesions, OP clear  Neck: soft/flat, no LAD  CNII-XII intact    LARYNGOSCOPY EXAM:     Verbal consent was obtained from patient prior to procedure.      Flexible laryngoscopy was performed and revealed the following:    Nasopharynx had no mass or exudate.    Base of tongue with mild BOT fullness    Epiglottis, both aryepiglottic folds and both false vocal folds were normal    Arytenoids both without edema and erythema     True vocal folds were fully mobile and without lesions.     Post cricoid area was clear    Interarytenoid edema was absent    The patient tolerated the procedure well.

## 2024-12-29 NOTE — ED ADULT TRIAGE NOTE - CHIEF COMPLAINT QUOTE
pt c/o facial swelling and trouble swallowing since yesterday.  Hx: facial inplants 5 months ago.  pt appears in no distress

## 2024-12-29 NOTE — CONSULT NOTE ADULT - SUBJECTIVE AND OBJECTIVE BOX
44 y.o. F presents with bilateral mandibular swelling. Patient has a history of ameloblastoma plexiform type, s/p segmental mandibular resection with placement of reconstruction plate (7/2020) c/b dehiscence, then referred to [facility] for definitive reconstruction. s/p removal of reconstruction plate and reconstruction with FFF (06/2022) with Dr. Bradshaw. s/p implant placement of #24,27 (8/15/23), s/p implant placement on #25 (11/14/23). s/p gingivectomy and placement of compression stent 3/12/24. Implant retained prosthesis loaded by Dr. Soto (8/20/24).     Pt reports swelling that started yesterday morning on the left mandible and this morning extending to the right mandible. She reports dysphagia and odynophagia. Denies dyspnea, dysphonia, and trismus. She reports that her skin feels warm b/l across her mandible. AVSS on RA, WBC 9.53, lactate 1.0. ED giving Unasyn. ENT scoped patient, airway patent.    PAST MEDICAL & SURGICAL HISTORY:  Benign neoplasm of lower jaw bone  History of mandibular surgery    MEDICATIONS  (STANDING):    MEDICATIONS  (PRN):    Allergies  No Known Allergies    FAMILY HISTORY:  Family history of diabetes mellitus (DM) (Mother)    Vital Signs Last 24 Hrs  T(C): 36.8 (29 Dec 2024 12:31), Max: 36.8 (29 Dec 2024 12:31)  T(F): 98.2 (29 Dec 2024 12:31), Max: 98.2 (29 Dec 2024 12:31)  HR: 96 (29 Dec 2024 12:31) (96 - 96)  BP: 119/80 (29 Dec 2024 12:31) (119/80 - 119/80)  BP(mean): --  RR: 18 (29 Dec 2024 12:31) (18 - 18)  SpO2: 97% (29 Dec 2024 12:31) (97% - 97%)    Parameters below as of 29 Dec 2024 12:31  Patient On (Oxygen Delivery Method): room air      Physical Exam:    Extraoral Exam:   H: Normocephalic, atraumatic.   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 Possible mild right marginal mandibular branch deficiency, though possibly 2/2 edema. Mild b/l edema along lateral border of mandible with pain to palpation and b/l pain to palpation along inferior border of mandible. No submandibular or submental swelling but very mild pain to palpation present.     E: + PERRLA. EOMI with no visual disturbances or signs of orbital trauma. No palpable step defects to the orbital rims bilaterally. No periorbital edema, subconjunctival hemorrhage, hyphema, chemosis or periorbital ecchymosis noted bilaterally.      E: No otorrhea or Presley's sign.      N: Nasal dorsum is midline with no cosmetic deformity. No edema, mobility or crepitus to nasal dorsum. Nares patent bilaterally with no dried blood, rhinorrhea, active epistaxis or septal hematoma.      T: Trachea is midline with no palpable masses and no lymphadenopathy.     Intraoral Exam   ANASTASIA >35mm. Airway is patent and intact. Mild vestibular fullness on right mandible. Purulence apical to implant retained prosthesis around implants #24/25, erythematous tissue along right buccal implant/gingival margin. No FOM elevation. FOM induration extending from #22 to posterior R mandible. Maxilla and mandible are stable with no segmental mobility. No intraoral lacerations, abrasions, or contusions. No evidence of acute dentoalveolar trauma.  No pigmented or ulcerative lesions.     LABS:                        13.6   9.53  )-----------( 223      ( 29 Dec 2024 14:05 )             39.7     12-29    139  |  102  |  6[L]  ----------------------------<  87  3.6   |  26  |  0.58    Ca    9.2      29 Dec 2024 14:05    TPro  8.3  /  Alb  4.4  /  TBili  0.4  /  DBili  x   /  AST  17  /  ALT  18  /  AlkPhos  90  12-29    Urinalysis Basic - ( 29 Dec 2024 14:05 )    Color: x / Appearance: x / SG: x / pH: x  Gluc: 87 mg/dL / Ketone: x  / Bili: x / Urobili: x   Blood: x / Protein: x / Nitrite: x   Leuk Esterase: x / RBC: x / WBC x   Sq Epi: x / Non Sq Epi: x / Bacteria: x      PT/INR - ( 29 Dec 2024 14:05 )   PT: 12.6 sec;   INR: 1.06 ratio         PTT - ( 29 Dec 2024 14:05 )  PTT:31.2 sec      Radiology:  - Pending     Assessment  44 y.o. F s/p segmental mandibular resection for plexiform ameloblastoma and reconstruction with FFF 06/2022 and implant #24, 25, 27 (2023) presents with purulence buccal to implants #24/25, FOM induration extending from #22-30 area, mild edema b/l to lateral surface of mandible with severe pain to palpation, and mild pain to palpation of inferior border of mandible and submandibular/submental areas possibly 2/2 implant or hardware failure     Recommendations:  - c/w Unasyn  - CT maxillofacial and neck w/ IV contrast  - NPO   - Page OMFS after CT is completed (do not need to wait for radiology read)    Esvin Albrecht  Oral and Maxillofacial Surgery  [facility] OMFS Pager #[phone]  [facility] Pager: [phone]  [facility] Pager: [phone]  Available on Teams 44 y.o. F presents with bilateral mandibular swelling. Patient has a history of ameloblastoma plexiform type, s/p segmental mandibular resection with placement of reconstruction plate (7/2020) c/b dehiscence, then referred to [facility] for definitive reconstruction. s/p removal of reconstruction plate and reconstruction with FFF (06/2022) with Dr. Bradshaw. s/p implant placement of #24,27 (8/15/23), s/p implant placement on #25 (11/14/23). s/p gingivectomy and placement of compression stent 3/12/24. Implant retained prosthesis loaded by Dr. Soto (8/20/24).     Pt reports swelling that started yesterday morning on the left mandible and this morning extending to the right mandible. She reports dysphagia and odynophagia. Denies dyspnea, dysphonia, and trismus. She reports that her skin feels warm b/l across her mandible. AVSS on RA, WBC 9.53, lactate 1.0. ED giving Unasyn. ENT scoped patient, airway patent.    PAST MEDICAL & SURGICAL HISTORY:  Benign neoplasm of lower jaw bone  History of mandibular surgery    MEDICATIONS  (STANDING):    MEDICATIONS  (PRN):    Allergies  No Known Allergies    FAMILY HISTORY:  Family history of diabetes mellitus (DM) (Mother)    Vital Signs Last 24 Hrs  T(C): 36.8 (29 Dec 2024 12:31), Max: 36.8 (29 Dec 2024 12:31)  T(F): 98.2 (29 Dec 2024 12:31), Max: 98.2 (29 Dec 2024 12:31)  HR: 96 (29 Dec 2024 12:31) (96 - 96)  BP: 119/80 (29 Dec 2024 12:31) (119/80 - 119/80)  BP(mean): --  RR: 18 (29 Dec 2024 12:31) (18 - 18)  SpO2: 97% (29 Dec 2024 12:31) (97% - 97%)    Parameters below as of 29 Dec 2024 12:31  Patient On (Oxygen Delivery Method): room air      Physical Exam:    Extraoral Exam:   H: Normocephalic, atraumatic.   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 Possible mild right marginal mandibular branch deficiency, though possibly 2/2 edema. Mild b/l edema along lateral border of mandible with pain to palpation and b/l pain to palpation along inferior border of mandible. No submandibular or submental swelling but very mild pain to palpation present.     E: + PERRLA. EOMI with no visual disturbances or signs of orbital trauma. No palpable step defects to the orbital rims bilaterally. No periorbital edema, subconjunctival hemorrhage, hyphema, chemosis or periorbital ecchymosis noted bilaterally.      E: No otorrhea or Presley's sign.      N: Nasal dorsum is midline with no cosmetic deformity. No edema, mobility or crepitus to nasal dorsum. Nares patent bilaterally with no dried blood, rhinorrhea, active epistaxis or septal hematoma.      T: Trachea is midline with no palpable masses and no lymphadenopathy.     Intraoral Exam   ANASTASIA >35mm. Airway is patent and intact. Mild vestibular fullness on right mandible. Fistula w/ purulence apical to implant retained prosthesis around implant #24, erythematous tissue along right buccal implant/gingival margin. No FOM elevation. FOM induration extending from #22 to posterior R mandible. Maxilla and mandible are stable with no segmental mobility. No intraoral lacerations, abrasions, or contusions. No evidence of acute dentoalveolar trauma.  No pigmented or ulcerative lesions.     LABS:                        13.6   9.53  )-----------( 223      ( 29 Dec 2024 14:05 )             39.7     12-29    139  |  102  |  6[L]  ----------------------------<  87  3.6   |  26  |  0.58    Ca    9.2      29 Dec 2024 14:05    TPro  8.3  /  Alb  4.4  /  TBili  0.4  /  DBili  x   /  AST  17  /  ALT  18  /  AlkPhos  90  12-29    Urinalysis Basic - ( 29 Dec 2024 14:05 )    Color: x / Appearance: x / SG: x / pH: x  Gluc: 87 mg/dL / Ketone: x  / Bili: x / Urobili: x   Blood: x / Protein: x / Nitrite: x   Leuk Esterase: x / RBC: x / WBC x   Sq Epi: x / Non Sq Epi: x / Bacteria: x      PT/INR - ( 29 Dec 2024 14:05 )   PT: 12.6 sec;   INR: 1.06 ratio         PTT - ( 29 Dec 2024 14:05 )  PTT:31.2 sec      Radiology:  - Pending     Assessment  44 y.o. F s/p segmental mandibular resection for plexiform ameloblastoma and reconstruction with FFF 06/2022 and implant #24, 25, 27 (2023) presents with purulence buccal to implants #24/25, FOM induration extending from #22-30 area, mild edema b/l to lateral surface of mandible with severe pain to palpation, and mild pain to palpation of inferior border of mandible and submandibular/submental areas possibly 2/2 implant or hardware failure     Recommendations:  - c/w Unasyn  - CT maxillofacial and neck w/ IV contrast  - NPO   - Page OMFS after CT is completed (do not need to wait for radiology read)    ____________________________________________________________________________________________________________  ADDENDUM: UPDATED CONSULT NOTE after CT:  _____________________________________________________________________________________________________________    CT Neck w/ contrast:  Lucency dental implant in mandibular anterior (in notes as implant #24)   IMPRESSION:  Extensive subcutaneous fat stranding and inflammatory changes in the   surrounding submandibular soft tissues with adjacent reactive lymph   nodes. Findings suggest an infection.    Evaluation for a collection is extremely limited secondary to beam   hardening streak artifact from the patient's hardware. No drainable   collection is appreciated. Resolution of the large collection present on   the previous study from 8/20/2022.      Assessment  44 y.o. F s/p segmental mandibular resection for plexiform ameloblastoma and reconstruction with FFF 06/2022 and implant #24, 25, 27 (2023) presents with fistula/purulence buccal to implants #24/, FOM induration extending from #22-30 area, mild edema b/l to lateral surface of mandible with severe pain to palpation, and mild pain to palpation of inferior border of mandible and submandibular/submental areas 2/2 implant #24 failure and possibly hardware failure     Plan:  - Fistula buccal to implant #24 irrigated with CHX  - Patient to be taken to OS clinic at 0800 for prosthesis removal by dental GPR service and explantation of implant #24 by OMFS and debridement of the area    Recommendations:  - CDU admission for observation  - c/w Unasyn  - Continue NPO status until re-evaluation in PAVAN Albrecht  Oral and Maxillofacial Surgery  [facility][phone]  [facility] Pager: [phone]  [facility] Pager: [phone]  Available on Teams

## 2024-12-29 NOTE — ED PROVIDER NOTE - PHYSICAL EXAMINATION
no tongue elevation  no drooling  + trismus  + thin white secretion starting to pool between lower lip and gum  lower gum line has areas that appear beefy and red   + ttp submandubilar  + ttp jawline b/l   + ttp area under tonuge

## 2024-12-30 ENCOUNTER — APPOINTMENT (OUTPATIENT)
Age: 44
End: 2024-12-30

## 2024-12-30 VITALS
SYSTOLIC BLOOD PRESSURE: 116 MMHG | HEART RATE: 82 BPM | RESPIRATION RATE: 16 BRPM | DIASTOLIC BLOOD PRESSURE: 80 MMHG | OXYGEN SATURATION: 96 % | TEMPERATURE: 98 F

## 2024-12-30 PROCEDURE — 99238 HOSP IP/OBS DSCHRG MGMT 30/<: CPT

## 2024-12-30 PROCEDURE — D6100: CPT

## 2024-12-30 RX ORDER — AMOXICILLIN/POTASSIUM CLAV 250-125 MG
875 TABLET ORAL
Qty: 14 | Refills: 0
Start: 2024-12-30 | End: 2025-01-05

## 2024-12-30 RX ORDER — CHLORHEXIDINE GLUCONATE 1.2 MG/ML
15 RINSE ORAL
Qty: 1 | Refills: 0
Start: 2024-12-30 | End: 2025-01-05

## 2024-12-30 RX ADMIN — AMPICILLIN AND SULBACTAM 200 GRAM(S): 1; .5 INJECTION, POWDER, FOR SOLUTION INTRAVENOUS at 05:44

## 2024-12-30 RX ADMIN — SODIUM CHLORIDE 125 MILLILITER(S): 9 INJECTION, SOLUTION INTRAMUSCULAR; INTRAVENOUS; SUBCUTANEOUS at 05:44

## 2024-12-30 NOTE — PROGRESS NOTE ADULT - SUBJECTIVE AND OBJECTIVE BOX
44 y.o. F s/p segmental mandibular resection for plexiform ameloblastoma and reconstruction with FFF 06/2022 and implant #24, 25, 27 (2023) presents with implant failure #25.    Physical Exam:    H: Normocephalic, atraumatic.   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 Possible mild right marginal mandibular branch deficiency, though possibly 2/2 edema. No submandibular or submental swelling.    E: + PERRLA. EOMI with no visual disturbances or signs of orbital trauma. No periorbital edema, subconjunctival hemorrhage, hyphema, chemosis or periorbital ecchymosis noted bilaterally.      E: No otorrhea or Presley's sign.      N: Nares patent bilaterally with no dried blood, rhinorrhea, active epistaxis or septal hematoma.      T: Trachea is midline with no palpable masses and no lymphadenopathy.     Intraoral Exam   ANASTASIA >35mm. Airway is patent and intact. Mild vestibular fullness on right mandible. Fistula w/ purulence apical to implant retained prosthesis around implant #24, erythematous tissue along right buccal implant/gingival margin. No FOM elevation. FOM induration extending from #22 to posterior R mandible. Maxilla and mandible are stable with no segmental mobility. No intraoral lacerations, abrasions, or contusions. No evidence of acute dentoalveolar trauma.  No pigmented or ulcerative lesions.     Vitals:     Vital Signs Last 24 Hrs  T(C): 37 (30 Dec 2024 05:15), Max: 37.2 (29 Dec 2024 21:34)  T(F): 98.6 (30 Dec 2024 05:15), Max: 99 (29 Dec 2024 21:57)  HR: 72 (30 Dec 2024 05:15) (55 - 96)  BP: 100/62 (30 Dec 2024 05:15) (100/62 - 120/83)  BP(mean): --  RR: 16 (30 Dec 2024 05:15) (16 - 18)  SpO2: 98% (30 Dec 2024 05:15) (96% - 99%)    Parameters below as of 30 Dec 2024 05:15  Patient On (Oxygen Delivery Method): room air        I&O's Detail      Medications:    MEDICATIONS  (STANDING):  ampicillin/sulbactam  IVPB 3 Gram(s) IV Intermittent every 6 hours  sodium chloride 0.9%. 1000 milliLiter(s) (125 mL/Hr) IV Continuous <Continuous>    MEDICATIONS  (PRN):  ketorolac   Injectable 15 milliGRAM(s) IV Push every 6 hours PRN Mild Pain (1 - 3)      Labs:                          13.6   9.53  )-----------( 223      ( 29 Dec 2024 14:05 )             39.7       12-29    139  |  102  |  6[L]  ----------------------------<  87  3.6   |  26  |  0.58    Ca    9.2      29 Dec 2024 14:05    TPro  8.3  /  Alb  4.4  /  TBili  0.4  /  DBili  x   /  AST  17  /  ALT  18  /  AlkPhos  90  12-29

## 2024-12-30 NOTE — ED CDU PROVIDER SUBSEQUENT DAY NOTE - PROGRESS NOTE DETAILS
Pt seen by OMFS this morning and brought to clinic, had dental implant removed, recommending discharge with augmentin and peridex rinse, pt has follow up already scheduled for later this week. Discharge discussed with CDU attending, pt will return to the ER with any worsening or concerning symptoms.

## 2024-12-30 NOTE — ED CDU PROVIDER DISPOSITION NOTE - CLINICAL COURSE
42-year-old female past medical history of ameloblastoma status post repair presents to the ED complaining of intraoral dehiscence status post right mandibulectomy and hardware removal.  Patient states she noticed facial swelling and pain around her jaw and neck with difficulty swallowing liquids since this morning.  While in ED labs acutely unremarkable.  CT neck significant for  lucency around [employer], extensive subcutaneous fat stranding and inflammatory changes suggestive of infection.  OMFS seen and evaluated patient at bedside in which performed an incision and drainage and would like patient placed in CDU for IV antibiotics, remain n.p.o. and will reassess in the morning.  This AM pt taken to clinic by OMFS, dental implant removed, recommending discharge on Augmentin and Peridex, pt has f/u scheduled for later this week already. Pt reports improvement in symptoms. Pt is well appearing at time of discharge. She will return to the ER with any worsening or concerning symptoms.

## 2024-12-30 NOTE — ED CDU PROVIDER DISPOSITION NOTE - DISCHARGE DATE
Date: 3/5/2024    Patient Name: Toro Hernandez          To Whom it may concern:    This letter has been written at the patient's request. The above patient was seen at [facility] for treatment of a medical condition.    This patient should be excused from attending work/school from 2.24.2024 through 3.5.2024    The patient may return to work/school on 3.5.2024.         Sincerely,        Wendy Cullen, APRN      
30-Dec-2024

## 2024-12-30 NOTE — ED CDU PROVIDER DISPOSITION NOTE - PATIENT PORTAL LINK FT
You can access the FollowMyHealth Patient Portal offered by [facility] by registering at the following website: [email or web address]. By joining [facility]’s FollowMyHealth portal, you will also be able to view your health information using other applications (apps) compatible with our system.

## 2024-12-30 NOTE — ED CDU PROVIDER DISPOSITION NOTE - NSFOLLOWUPINSTRUCTIONS_ED_ALL_ED_FT
Follow up with OMFS this week, you have an appointment scheduled  Follow up with your primary care doctor  Take Augmentin 875mg (1 tablet) twice a day for 7 days  Use Peridex rinse 15mL twice a day  Take Tylenol 650mg every 6 hours as needed for pain  -You can also take Ibuprofen 600mg every 6-8 hours for pain, take with food  Return to the ER with any worsening or concerning symptoms, fever/chills, increased pain or swelling, neck swelling, difficulty swallowing or breathing or any other concerns.    Seguimiento con OMFS esta semana, tienes richi programada  Louann un seguimiento con soliman médico de atención primaria.  Numa Augmentin 875 mg (1 comprimido) dos veces al día olivia 7 días.  Utilice enjuague Peridex 15 ml dos veces al día.  Numa Tylenol 650 mg cada 6 horas según sea necesario para el dolor.  -También puedes carmen Ibuprofeno 600 mg cada 6-8 horas para el dolor, carmen con comida.  Regrese a la raúl de emergencias si cualquier síntoma empeora o le preocupa, fiebre/escalofríos, aumento del dolor o hinchazón, hinchazón del camila, dificultad para tragar o respirar o cualquier otra inquietud. Render In Strict Bullet Format?: No Detail Level: Zone Plan: Recommended gentle cleanser to face as needed for dryness.

## 2024-12-30 NOTE — ED CDU PROVIDER DISPOSITION NOTE - ATTENDING APP SHARED VISIT CONTRIBUTION OF CARE
Refill Routing Note   Medication(s) are not appropriate for processing by [facility] for the following reason(s):      - Outside of protocol    [facility] action(s):  Route          Medication reconciliation completed: No     Appointments  past 12m or future 3m with PCP    Date Provider   Last Visit   3/25/2022 Piotr Walker MD   Next Visit   9/30/2022 Piotr Walker MD   ED visits in past 90 days: 0        Note composed:11:07 AM 04/21/2022           eric: Ms. Amaral is a 44-year-old woman who has a history of mandibular resection for plexiform ameloblastoma and reconstruction in June 2022 she also had multiple implants placed with implant failure for one of the implants.  Here comes with a fistula with purulence apical to the implant with retained prosthesis around implant #24 and erythematous tissue along the right buccal implant gingival margin.    Oral surgery was involved and they have in the intervening time in CDU remove the implant as well as debrided the area.  They feel that the patient is appropriate for discharge at this time on p.o. antibiotics.  She has follow-up this week with oral surgery.    Vital signs include blood pressure 100/62 respiratory rate 16 heart rate 72 O2 sat is 98.    Patient is awake and alert and appropriate.  Able to communicate well.  States she feels much improved.  Nontoxic-appearing.  Voice is normal.    She understands follow-up and need for antibiotics    I performed a history and physical exam of the patient and discussed their management with the resident and /or advanced care provider. I personally made/approved the management plan and take responsibility for the patient management. I reviewed the resident and /or ACP's note and agree with the documented findings and plan of care. My medical decison making and observations are found above.

## 2024-12-30 NOTE — PROGRESS NOTE ADULT - ASSESSMENT
44 y.o. F s/p segmental mandibular resection for plexiform ameloblastoma and reconstruction with FFF 06/2022 and implant #24, 25, 27 (2023) presents with implant failure #25.    - c/w Andres  - Dr Soto will remove prosthesis in dental clinic  - [facility] to remove implant and debride site    Alena Alberto  Oral and Maxillofacial Surgery  [facility] Pager #[phone]  [facility] Pager: [phone]  [facility] Pager: [phone]  Available on Teams

## 2024-12-30 NOTE — PROCEDURE NOTE - ADDITIONAL PROCEDURE DETAILS
44 y.o. F s/p segmental mandibular resection for plexiform ameloblastoma and reconstruction with FFF 06/2022 and implant #24, 25, 27 (2023) presented to ED on 12/30 with implant failure #25. Pt admitted in CDU and brought to clinic this am.    Exam:    ANASTASIA >35mm. Airway is patent and intact. Mild vestibular fullness on right mandible prosthesis around implant #24, erythematous tissue along right buccal implant/gingival margin. No FOM elevation. Maxilla and mandible are stable with no segmental mobility. No intraoral lacerations, abrasions, or contusions. No evidence of acute dentoalveolar trauma.  No pigmented or ulcerative lesions.     Procedure:    Risks and benefits discussed with patient.  Removal of LR mandibular prosthesis by Dr Soto, implant #25 came out with prosthesis.   Site irrigated with Chlorhexidine rinse, comfort caps placed on remaining implants #27 and #28.  Gingival inflammation around sites #27 and 28 noted.    Post OP Panoramic done    Pt transferred back to CDU once treatment completed.    Assessment/plan  44 y.o. F s/p segmental mandibular resection for plexiform ameloblastoma and reconstruction with FFF 06/2022 and implant #24, 25, 27 (2023) presented to ED on 12/30 with implant failure #25 now s/p removal of prosthesis and implant #25 in clinic.    - Augmentin x 7 days  - Peridex x 14 days  - OTC pain meds prn  - F/U 1 week on Tuesday 1/7/25 with OMFS    Alena Soto/ Dr Bradshaw

## 2024-12-30 NOTE — ED CDU PROVIDER SUBSEQUENT DAY NOTE - ATTENDING APP SHARED VISIT CONTRIBUTION OF CARE
eric: Ms. Amaral is a 44-year-old woman who has a history of mandibular resection for plexiform ameloblastoma and reconstruction in June 2022 she also had multiple implants placed with implant failure for one of the implants.  Here comes with a fistula with purulence apical to the implant with retained prosthesis around implant #24 and erythematous tissue along the right buccal implant gingival margin.    Oral surgery was involved and they have in the intervening time in CDU remove the implant as well as debrided the area.  They feel that the patient is appropriate for discharge at this time on p.o. antibiotics.  She has follow-up this week with oral surgery.    Vital signs include blood pressure 100/62 respiratory rate 16 heart rate 72 O2 sat is 98.    Patient is awake and alert and appropriate.  Able to communicate well.  States she feels much improved.  Nontoxic-appearing.  Voice is normal.    She understands follow-up and need for antibiotics    I performed a history and physical exam of the patient and discussed their management with the resident and /or advanced care provider. I personally made/approved the management plan and take responsibility for the patient management. I reviewed the resident and /or ACP's note and agree with the documented findings and plan of care. My medical decison making and observations are found above.

## 2024-12-30 NOTE — ED CDU PROVIDER DISPOSITION NOTE - NS ED MD DISPO DISCHARGE
Call (attempt #2): Called patient, left VM message requesting callback to PCP office regarding lab results.  Will continue to follow up and await patient callback.  If no callback after 3 call attempts, will send Unable to Reach letter to patient's listed home address.      Home

## 2024-12-30 NOTE — ED CDU PROVIDER SUBSEQUENT DAY NOTE - HISTORY
42-year-old female past medical history of ameloblastoma status post repair presents to the ED complaining of intraoral dehiscence status post right mandibulectomy and hardware removal.  Patient states she noticed facial swelling and pain around her jaw and neck with difficulty swallowing liquids since this morning.  While in ED labs acutely unremarkable.  CT neck significant for  lucency around [employer], extensive subcutaneous fat stranding and inflammatory changes suggestive of infection.  OMFS seen and evaluated patient at bedside in which performed an incision and drainage and would like patient placed in CDU for IV antibiotics, remain n.p.o. and will reassess in the morning.  ID #: [id number] ([id number])    Interval hx: Patient seen resting comfortably and in NAD. VSS. Patient speaking in full sentences and offers no complaints at this time.

## 2025-01-03 LAB
CULTURE RESULTS: SIGNIFICANT CHANGE UP
CULTURE RESULTS: SIGNIFICANT CHANGE UP
SPECIMEN SOURCE: SIGNIFICANT CHANGE UP
SPECIMEN SOURCE: SIGNIFICANT CHANGE UP

## 2025-01-08 ENCOUNTER — APPOINTMENT (OUTPATIENT)
Age: 45
End: 2025-01-08

## 2025-01-08 PROCEDURE — 99213 OFFICE O/P EST LOW 20 MIN: CPT

## 2025-01-14 ENCOUNTER — APPOINTMENT (OUTPATIENT)
Age: 45
End: 2025-01-14
Payer: COMMERCIAL

## 2025-01-14 PROCEDURE — 99024 POSTOP FOLLOW-UP VISIT: CPT

## 2025-03-12 ENCOUNTER — APPOINTMENT (OUTPATIENT)
Age: 45
End: 2025-03-12

## 2025-03-12 PROCEDURE — 99213 OFFICE O/P EST LOW 20 MIN: CPT

## 2025-04-30 ENCOUNTER — APPOINTMENT (OUTPATIENT)
Age: 45
End: 2025-04-30

## 2025-04-30 PROCEDURE — 99213 OFFICE O/P EST LOW 20 MIN: CPT

## 2025-05-21 ENCOUNTER — APPOINTMENT (OUTPATIENT)
Age: 45
End: 2025-05-21
Payer: SELF-PAY

## 2025-05-21 PROCEDURE — 99024 POSTOP FOLLOW-UP VISIT: CPT

## 2025-05-27 ENCOUNTER — APPOINTMENT (OUTPATIENT)
Age: 45
End: 2025-05-27

## 2025-05-27 PROCEDURE — 99212 OFFICE O/P EST SF 10 MIN: CPT

## 2025-08-13 ENCOUNTER — APPOINTMENT (OUTPATIENT)
Age: 45
End: 2025-08-13
Payer: COMMERCIAL

## 2025-08-13 PROCEDURE — 99024 POSTOP FOLLOW-UP VISIT: CPT

## 2025-09-10 ENCOUNTER — APPOINTMENT (OUTPATIENT)
Age: 45
End: 2025-09-10

## (undated) DEVICE — GLV 8 PROTEXIS (WHITE)

## (undated) DEVICE — SOL IRR POUR NS 0.9% 500ML

## (undated) DEVICE — MERCIAN VISABILITY BACKROUND YELLOW

## (undated) DEVICE — PACK FREE FLAP

## (undated) DEVICE — DRAPE SPLIT SHEET 77" X 120"

## (undated) DEVICE — DRAPE 3/4 SHEET 52X76"

## (undated) DEVICE — BIPOLAR FORCEP CORD WECK STANDARD 12FT

## (undated) DEVICE — TOURNIQUET CUFF 34" SINGLE PORT W PLC (BLACK)

## (undated) DEVICE — DRAIN BLAKE 10FR ROUND

## (undated) DEVICE — FEEDING TUBE NG 12FR 36"

## (undated) DEVICE — DRSG CURITY GAUZE SPONGE 4 X 4" 12-PLY

## (undated) DEVICE — SUCTION MICROMAT 3FR

## (undated) DEVICE — DRAPE ARMATEC HANDLE 5" X 10"

## (undated) DEVICE — DRSG ACE BANDAGE 4" NS

## (undated) DEVICE — WARMING BLANKET FULL UNDERBODY

## (undated) DEVICE — DRSG WEBRIL 6"

## (undated) DEVICE — WARMING BLANKET FULL ADULT

## (undated) DEVICE — LABELS BLANK W PEN

## (undated) DEVICE — CANISTER DISPOSABLE THIN WALL 3000CC

## (undated) DEVICE — SUT ETHILON 8-0 5" BV130-5

## (undated) DEVICE — SUT CHROMIC 3-0 27" RB-1

## (undated) DEVICE — SUT SILK 2-0 18" TIES

## (undated) DEVICE — POSITIONER FOAM EGG CRATE ULNAR 2PCS (PINK)

## (undated) DEVICE — TOURNIQUET ESMARK 6"

## (undated) DEVICE — PACK DENTAL MINOR

## (undated) DEVICE — DRAPE FLUID WARMER 44 X 44"

## (undated) DEVICE — SOL INJ NS 0.9% 1000ML

## (undated) DEVICE — PREP BETADINE SPONGE STICKS

## (undated) DEVICE — TWIST DRILL 1.5MM DIA X 50MM W/NOTCH SGL USE ONLY

## (undated) DEVICE — GEL ULTRASOUND 0.25L

## (undated) DEVICE — Device

## (undated) DEVICE — PROTECTOR HEEL / ELBOW FLUFFY

## (undated) DEVICE — TOURNIQUET CUFF 18" DUAL PORT SINGLE BLADDER LUER LOCK (BLACK)

## (undated) DEVICE — BUR STRYKER OVAL CARBIDE 4MM

## (undated) DEVICE — LIJ-STRYKER REMB ORAL DRILLS: Type: DURABLE MEDICAL EQUIPMENT

## (undated) DEVICE — SUT ETHILON 9-0 5" BV100-4

## (undated) DEVICE — PACK BATTERY SINGLE USE SD-2000

## (undated) DEVICE — BRA JAW

## (undated) DEVICE — ELCTR GROUNDING PAD ADULT COVIDIEN

## (undated) DEVICE — DRAPE FLUID WARMER 44 X 66"

## (undated) DEVICE — SPEAR SURG WECKCEL

## (undated) DEVICE — BASIN SET DOUBLE

## (undated) DEVICE — GOWN LG

## (undated) DEVICE — SUT PROLENE 5-0 36" RB-1

## (undated) DEVICE — ELCTR BOVIE TIP BLADE INSULATED 2.75" EDGE

## (undated) DEVICE — TOURNIQUET CUFF 24" DUAL PORT SINGLE BLADDER LUER LOCK  (BLACK)

## (undated) DEVICE — PREP CHLORAPREP HI-LITE ORANGE 26ML

## (undated) DEVICE — BUR LINVATEC CARBIDE SIDECUTTING 0.8MM 4.9MM

## (undated) DEVICE — SUT VICRYL 4-0 27" RB-1 UNDYED

## (undated) DEVICE — SUT SILK 3-0 18" TIES

## (undated) DEVICE — DRAIN RESERVOIR FOR JACKSON PRATT 100CC CARDINAL

## (undated) DEVICE — PACK DENTAL

## (undated) DEVICE — BUR LINVATEC OVAL MEDIUM 4MM CARBIDE

## (undated) DEVICE — DOPPLER PROBE  CABLE

## (undated) DEVICE — SYR LUER LOK 10CC

## (undated) DEVICE — SPEAR SURG EYE WECK-CELL CELOS

## (undated) DEVICE — CANNULA ANT CHMBR 27GX22MM

## (undated) DEVICE — DRAIN PENROSE .25" X 18" LATEX

## (undated) DEVICE — DRAPE TOWEL BLUE 17" X 24"

## (undated) DEVICE — STAPLER SKIN VISI-STAT 35 WIDE

## (undated) DEVICE — SAW BLADE STRYKER RECIPROCATING 22.5MMX0.38MM

## (undated) DEVICE — TWIST DRILL 1.5MM DIA X 115MM W/NOTCH SGL USE ONLY

## (undated) DEVICE — BLADE SURGICAL #15 CARBON

## (undated) DEVICE — PREP BETADINE KIT

## (undated) DEVICE — TOURNIQUET CUFF 34" DUAL PORT W PLC

## (undated) DEVICE — FEEDING TUBE 12FR BULLET TIP

## (undated) DEVICE — VENODYNE/SCD SLEEVE CALF MEDIUM

## (undated) DEVICE — PUNCH BIOPSY 3MM DISP

## (undated) DEVICE — NDL COUNTER FOAM AND MAGNET 20-40

## (undated) DEVICE — SYR LUER LOK 20CC

## (undated) DEVICE — DRAPE INSTRUMENT POUCH 6.75" X 11"

## (undated) DEVICE — SUT CHROMIC 4-0 27" RB-1

## (undated) DEVICE — DRSG TELFA 3 X 8

## (undated) DEVICE — TOURNIQUET CUFF 24" DUAL PORT DUAL BLADDER W PLC (BLACK)

## (undated) DEVICE — KLS MARTIN MODEL CUSTOM HALF IPS

## (undated) DEVICE — DRSG STOCKINETTE IMPERVIOUS LG

## (undated) DEVICE — FOLEY TRAY 16FR 5CC LF UMETER CLOSED

## (undated) DEVICE — ELCTR BOVIE PENCIL SMOKE EVACUATION

## (undated) DEVICE — SYR LUER LOK 3CC

## (undated) DEVICE — POSITIONER STRAP ARMBOARD VELCRO TS-30

## (undated) DEVICE — MODEL IPS VERIFICATION

## (undated) DEVICE — ELCTR COLORADO 3CM

## (undated) DEVICE — IMP CUSTOM IPS 6.0MM

## (undated) DEVICE — DRSG XEROFORM 5 X 9"

## (undated) DEVICE — LIJ-ZIMMER MESHGRAFTER: Type: DURABLE MEDICAL EQUIPMENT